# Patient Record
Sex: FEMALE | Race: WHITE | NOT HISPANIC OR LATINO | Employment: UNEMPLOYED | URBAN - METROPOLITAN AREA
[De-identification: names, ages, dates, MRNs, and addresses within clinical notes are randomized per-mention and may not be internally consistent; named-entity substitution may affect disease eponyms.]

---

## 2017-12-19 ENCOUNTER — HISTORICAL (OUTPATIENT)
Dept: RADIOLOGY | Facility: HOSPITAL | Age: 22
End: 2017-12-19

## 2018-01-08 ENCOUNTER — HISTORICAL (OUTPATIENT)
Dept: SURGERY | Facility: HOSPITAL | Age: 23
End: 2018-01-08

## 2018-01-11 ENCOUNTER — HISTORICAL (OUTPATIENT)
Dept: ANESTHESIOLOGY | Facility: HOSPITAL | Age: 23
End: 2018-01-11

## 2019-10-21 ENCOUNTER — HISTORICAL (OUTPATIENT)
Dept: LAB | Facility: HOSPITAL | Age: 24
End: 2019-10-21

## 2019-10-25 ENCOUNTER — HISTORICAL (OUTPATIENT)
Dept: RADIOLOGY | Facility: HOSPITAL | Age: 24
End: 2019-10-25

## 2019-10-31 ENCOUNTER — HISTORICAL (OUTPATIENT)
Dept: LAB | Facility: HOSPITAL | Age: 24
End: 2019-10-31

## 2020-12-17 LAB
PAP RECOMMENDATION EXT: NORMAL
PAP SMEAR: NORMAL

## 2021-06-28 ENCOUNTER — HISTORICAL (OUTPATIENT)
Dept: ADMINISTRATIVE | Facility: HOSPITAL | Age: 26
End: 2021-06-28

## 2021-06-28 LAB
APPEARANCE, UA: ABNORMAL
BACTERIA SPEC CULT: ABNORMAL
BILIRUB UR QL STRIP: NEGATIVE
COLOR UR: YELLOW
GLUCOSE (UA): NEGATIVE
HGB UR QL STRIP: NEGATIVE
KETONES UR QL STRIP: NEGATIVE
LEUKOCYTE ESTERASE UR QL STRIP: ABNORMAL
MUCOUS THREADS URNS QL MICRO: PRESENT
NITRITE UR QL STRIP: NEGATIVE
PH UR STRIP: 6.5 [PH] (ref 4.6–8)
PROT UR QL STRIP: ABNORMAL
RBC #/AREA URNS HPF: ABNORMAL /HPF
SP GR UR STRIP: 1.02 (ref 1–1.03)
SQUAMOUS EPITHELIAL, UA: ABNORMAL
UROBILINOGEN UR STRIP-ACNC: 1
WBC #/AREA URNS HPF: ABNORMAL /HPF

## 2021-09-07 ENCOUNTER — HISTORICAL (OUTPATIENT)
Dept: LAB | Facility: HOSPITAL | Age: 26
End: 2021-09-07

## 2022-01-28 ENCOUNTER — HISTORICAL (OUTPATIENT)
Dept: LAB | Facility: HOSPITAL | Age: 27
End: 2022-01-28

## 2022-01-28 LAB
APPEARANCE, UA: CLEAR
BACTERIA SPEC CULT: NORMAL
BILIRUB UR QL STRIP: NEGATIVE
COLOR UR: YELLOW
GLUCOSE (UA): NEGATIVE
HGB UR QL STRIP: NEGATIVE
KETONES UR QL STRIP: NEGATIVE
LEUKOCYTE ESTERASE UR QL STRIP: NEGATIVE
NITRITE UR QL STRIP: NEGATIVE
PH UR STRIP: 6.5 [PH] (ref 4.6–8)
PROT UR QL STRIP: NEGATIVE
RBC #/AREA URNS HPF: NORMAL /[HPF]
SARS-COV-2 AG RESP QL IA.RAPID: NEGATIVE
SP GR UR STRIP: 1.02 (ref 1–1.03)
SQUAMOUS EPITHELIAL, UA: NORMAL
UROBILINOGEN UR STRIP-ACNC: 0.2
WBC #/AREA URNS HPF: NORMAL /[HPF]

## 2022-03-24 ENCOUNTER — HISTORICAL (OUTPATIENT)
Dept: RADIOLOGY | Facility: HOSPITAL | Age: 27
End: 2022-03-24

## 2022-03-24 ENCOUNTER — HISTORICAL (OUTPATIENT)
Dept: ADMINISTRATIVE | Facility: HOSPITAL | Age: 27
End: 2022-03-24

## 2022-04-07 ENCOUNTER — HISTORICAL (OUTPATIENT)
Dept: ADMINISTRATIVE | Facility: HOSPITAL | Age: 27
End: 2022-04-07
Payer: MEDICAID

## 2022-04-24 VITALS
DIASTOLIC BLOOD PRESSURE: 69 MMHG | SYSTOLIC BLOOD PRESSURE: 110 MMHG | HEIGHT: 60 IN | BODY MASS INDEX: 27.48 KG/M2 | OXYGEN SATURATION: 99 % | WEIGHT: 140 LBS

## 2022-04-30 NOTE — OP NOTE
ADMITTING DIAGNOSIS:  Supraumbilical ventral hernia, not reducible, not obstructing, initial onset.    PROCEDURE:  Laparoscopic supraumbilical ventral hernia repair with a 9 cm circular Symbotex mesh.    POSTOPERATIVE DIAGNOSES:  Repair of initial onset ventral incisional hernia with a 9 cm Symbotex mesh using secure strap and ProTack and then reperitonealization of the nonadhesive side with the adhesive side toward the fascia.    PROCEDURE IN DETAIL:  The patient is a 22-year-old  female with anxiety, depressive disorder, bipolar disorder.  Smokes a half pack a day for about 5 years and had a  by Dr. Marroquin and Wilfrid as well as a laparoscopic cholecystectomy in .  She has a supraumbilical ventral hernia that was initially reducible and now nonreducible, not obstructed and required repair the patient was scheduled for repair after ultrasound on 2017 showed evidence that it was normal.  She was scheduled today for repair of a hernia defect.  The patient was brought to the operating room, supine position.  General anesthetic.  Prepped and draped in a sterile fashion.  Had a Visiport placed in the left upper quadrant with insertion of a 5 mm trocar in the left mid quadrant and another 5 mm trocar in the right upper quadrant.  The patient underwent reduction of the preperitoneal fat.  Two small 1-2 cm holes were identified [id number] in the supraumbilical region with preperitoneal fat incarcerated within them.  The preperitoneal fat was reduced and the patient underwent coverage.  A piece of mesh was placed right in between the 2 holes for good coverage circumferentially.  A 9 cm mesh was used.  The patient had the mesh tacked with a secure strap and ProTack, the adhesive side toward the fascia and the nonadhesive side toward the bowel.  The patient underwent reperitonealization of the peritoneum over the mesh and it was tacked as well to the mesh and fascia to prevent slippage of  the mesh.  Good coverage of the nonadhesive side of the mesh was also accomplished, approximately 50% of it was covered with the peritoneum that was remaining.  The patient did     well, had no problems or difficulties, was awakened and sent to recovery in good condition.  I appreciate the consultation referral from Dr. Porter and will notify him of my findings.        KURT/ESTUARDO   DD: 01/11/2018 1410   DT: 01/11/2018 1453  Job # [id number]    cc: Dr. Porter

## 2022-05-16 ENCOUNTER — OFFICE VISIT (OUTPATIENT)
Dept: FAMILY MEDICINE | Facility: CLINIC | Age: 27
End: 2022-05-16
Payer: MEDICAID

## 2022-05-16 VITALS
BODY MASS INDEX: 28.91 KG/M2 | HEART RATE: 76 BPM | SYSTOLIC BLOOD PRESSURE: 107 MMHG | RESPIRATION RATE: 18 BRPM | TEMPERATURE: 98 F | OXYGEN SATURATION: 99 % | WEIGHT: 147.25 LBS | HEIGHT: 60 IN | DIASTOLIC BLOOD PRESSURE: 74 MMHG

## 2022-05-16 DIAGNOSIS — K59.00 CONSTIPATION, UNSPECIFIED CONSTIPATION TYPE: ICD-10-CM

## 2022-05-16 DIAGNOSIS — K64.5 EXTERNAL THROMBOSED HEMORRHOIDS: Primary | ICD-10-CM

## 2022-05-16 DIAGNOSIS — N91.2 AMENORRHEA: ICD-10-CM

## 2022-05-16 DIAGNOSIS — N92.6 IRREGULAR MENSTRUAL CYCLE: ICD-10-CM

## 2022-05-16 LAB
B-HCG UR QL: NEGATIVE
CTP QC/QA: YES

## 2022-05-16 PROCEDURE — 99214 PR OFFICE/OUTPT VISIT, EST, LEVL IV, 30-39 MIN: ICD-10-PCS | Mod: ,,, | Performed by: FAMILY MEDICINE

## 2022-05-16 PROCEDURE — 3078F DIAST BP <80 MM HG: CPT | Mod: CPTII,,, | Performed by: FAMILY MEDICINE

## 2022-05-16 PROCEDURE — 81025 POCT URINE PREGNANCY: ICD-10-PCS | Mod: ,,, | Performed by: FAMILY MEDICINE

## 2022-05-16 PROCEDURE — 3074F PR MOST RECENT SYSTOLIC BLOOD PRESSURE < 130 MM HG: ICD-10-PCS | Mod: CPTII,,, | Performed by: FAMILY MEDICINE

## 2022-05-16 PROCEDURE — 1159F PR MEDICATION LIST DOCUMENTED IN MEDICAL RECORD: ICD-10-PCS | Mod: CPTII,,, | Performed by: FAMILY MEDICINE

## 2022-05-16 PROCEDURE — 1160F RVW MEDS BY RX/DR IN RCRD: CPT | Mod: CPTII,,, | Performed by: FAMILY MEDICINE

## 2022-05-16 PROCEDURE — 3008F BODY MASS INDEX DOCD: CPT | Mod: CPTII,,, | Performed by: FAMILY MEDICINE

## 2022-05-16 PROCEDURE — 99214 OFFICE O/P EST MOD 30 MIN: CPT | Mod: ,,, | Performed by: FAMILY MEDICINE

## 2022-05-16 PROCEDURE — 3074F SYST BP LT 130 MM HG: CPT | Mod: CPTII,,, | Performed by: FAMILY MEDICINE

## 2022-05-16 PROCEDURE — 1159F MED LIST DOCD IN RCRD: CPT | Mod: CPTII,,, | Performed by: FAMILY MEDICINE

## 2022-05-16 PROCEDURE — 3078F PR MOST RECENT DIASTOLIC BLOOD PRESSURE < 80 MM HG: ICD-10-PCS | Mod: CPTII,,, | Performed by: FAMILY MEDICINE

## 2022-05-16 PROCEDURE — 3008F PR BODY MASS INDEX (BMI) DOCUMENTED: ICD-10-PCS | Mod: CPTII,,, | Performed by: FAMILY MEDICINE

## 2022-05-16 PROCEDURE — 81025 URINE PREGNANCY TEST: CPT | Mod: ,,, | Performed by: FAMILY MEDICINE

## 2022-05-16 PROCEDURE — 1160F PR REVIEW ALL MEDS BY PRESCRIBER/CLIN PHARMACIST DOCUMENTED: ICD-10-PCS | Mod: CPTII,,, | Performed by: FAMILY MEDICINE

## 2022-05-16 RX ORDER — QUETIAPINE FUMARATE 50 MG/1
TABLET, FILM COATED ORAL
COMMUNITY
Start: 2022-01-25 | End: 2022-06-24

## 2022-05-16 RX ORDER — POLYETHYLENE GLYCOL 3350 17 G/17G
17 POWDER, FOR SOLUTION ORAL DAILY
Qty: 17 G | Refills: 3 | Status: SHIPPED | OUTPATIENT
Start: 2022-05-16 | End: 2022-06-15

## 2022-05-16 RX ORDER — ARIPIPRAZOLE 15 MG/1
TABLET ORAL
COMMUNITY
Start: 2022-01-25 | End: 2022-06-24

## 2022-05-16 RX ORDER — BUPROPION HYDROCHLORIDE 300 MG/1
TABLET ORAL
COMMUNITY
Start: 2022-01-25 | End: 2022-06-24

## 2022-05-16 RX ORDER — CYCLOBENZAPRINE HCL 10 MG
TABLET ORAL
COMMUNITY
Start: 2022-04-11 | End: 2022-06-24

## 2022-05-16 RX ORDER — BUSPIRONE HYDROCHLORIDE 15 MG/1
TABLET ORAL
COMMUNITY
Start: 2022-01-25 | End: 2022-06-24

## 2022-05-16 RX ORDER — TIZANIDINE 4 MG/1
TABLET ORAL
COMMUNITY
Start: 2022-04-29 | End: 2022-06-24

## 2022-05-16 NOTE — PROGRESS NOTES
Adrienne Caba  05/16/2022  [id number]    Marcia Garrido MD  Patient Care Team:  Marcia Mckeon MD as PCP - General (Family Medicine)      Chief Complaint:  Chief Complaint   Patient presents with    Hemorrhoids    Amenorrhea       History of Present Illness:  HPI    27 y.o. female who presents today c/o hemorrhoids and amenorrhea. States she is one week late for cycle. Of note, she has been late a few times and has presented to ED for this several times over the past 6 mts. She has not taken a UPT at home. UPT neg in office.      She c/o rectal pain when sitting and when having bm's and when wiping. Has not noticed any blood in stool or on tissue. She states she is having a BM 1-2x per day that she describes as pellets and she does have strain. It alternates with diarrhea at times. She has not tried any otc meds for hard stool or for hemorrhoids.     Review of Systems  General: denies f/c, weight loss, night sweats, decreased appetite  Eye: denies blurred vision, changes in vision  Respiratory: denies sob, wheezing, cough  Cardiovascular: denies chest pain, palpitations, edema  Gastrointestinal: denies abdominal pain, n/v, constipation, diarrhea  Integumentary: denies rashes, pruritis        Health Maintenance  Health Maintenance Topics with due status: Not Due       Topic Last Completion Date    TETANUS VACCINE 10/31/2019    Influenza Vaccine 10/31/2019     Health Maintenance Due   Topic Date Due    Hepatitis C Screening  Never done    Lipid Panel  Never done    COVID-19 Vaccine (1) Never done    Pneumococcal Vaccines (Age 0-64) (2 - PPSV23 or PCV20) 01/22/2003    HIV Screening  Never done    Pap Smear  Never done       Exam:  Vitals:    05/16/22 0930   BP: 107/74   Pulse: 76   Resp: 18   Temp: 98.1 °F (36.7 °C)     Weight: 66.8 kg (147 lb 4.3 oz)   Body mass index is 28.54 kg/m².      Physical Exam  Constitutional: NAD, alert, pleasant  Respiratory: CTAB, no wheezes, rales or rhonchi. No  accessory muscle use  Eyes: EOMI  Cardiovascular: RRR, No m/r/g. No JVD. No LE edema  Gastrointestinal: BS+, nontender, nondistended  Integumentary: warm, dry, intact  Psych: AA&Ox3  Rectal exam: (rachel present) thrombosed hemorrhoid at 9 o' clock position        Assessment:  Problem List Items Addressed This Visit    None     Visit Diagnoses     External thrombosed hemorrhoids    -  Primary    Relevant Orders    Ambulatory referral/consult to Gastroenterology    Amenorrhea        Relevant Orders    POCT Urine Pregnancy    Irregular menstrual cycle        Constipation, unspecified constipation type        Relevant Orders    Ambulatory referral/consult to Gastroenterology          Plan:  External thrombosed hemorrhoids  -     Ambulatory referral/consult to Gastroenterology; Future; Expected date: 05/23/2022    Amenorrhea  -     POCT Urine Pregnancy    Irregular menstrual cycle    Constipation, unspecified constipation type  -     Ambulatory referral/consult to Gastroenterology; Future; Expected date: 05/23/2022    Other orders  -     polyethylene glycol (GLYCOLAX) 17 gram/dose powder; Take 17 g by mouth once daily.  Dispense: 17 g; Refill: 3    - start miralax daily. Drink  oz of water per day. Refer to gi. Increase fiber intake  - UPT neg. Call me if no cycle in one week.    -Patient's lab results were reviewed and discussed with patient  -Treatment options and alternatives were discussed with the patient. Patient expressed understanding. Patient was given the opportunity to ask questions and be an active participant in their medical care. Patient had no further questions or concerns at this time.       Follow up: No follow-ups on file.      Care Plan/Goals: Reviewed   Goals    None

## 2022-06-11 ENCOUNTER — HOSPITAL ENCOUNTER (EMERGENCY)
Facility: HOSPITAL | Age: 27
Discharge: HOME | End: 2022-06-12
Attending: STUDENT IN AN ORGANIZED HEALTH CARE EDUCATION/TRAINING PROGRAM
Payer: MEDICAID

## 2022-06-11 DIAGNOSIS — L03.112 CELLULITIS OF LEFT AXILLA: Primary | ICD-10-CM

## 2022-06-11 DIAGNOSIS — L03.113 CELLULITIS OF RIGHT UPPER EXTREMITY: ICD-10-CM

## 2022-06-11 PROCEDURE — 99283 EMERGENCY DEPT VISIT LOW MDM: CPT

## 2022-06-11 RX ORDER — LIDOCAINE HYDROCHLORIDE 10 MG/ML
10 INJECTION, SOLUTION EPIDURAL; INFILTRATION; INTRACAUDAL; PERINEURAL
Status: DISCONTINUED | OUTPATIENT
Start: 2022-06-11 | End: 2022-06-12 | Stop reason: HOSPADM

## 2022-06-12 VITALS
WEIGHT: 145 LBS | OXYGEN SATURATION: 98 % | BODY MASS INDEX: 29.23 KG/M2 | RESPIRATION RATE: 18 BRPM | TEMPERATURE: 98 F | HEIGHT: 59 IN | DIASTOLIC BLOOD PRESSURE: 86 MMHG | SYSTOLIC BLOOD PRESSURE: 116 MMHG | HEART RATE: 73 BPM

## 2022-06-12 PROCEDURE — 25000003 PHARM REV CODE 250: Performed by: STUDENT IN AN ORGANIZED HEALTH CARE EDUCATION/TRAINING PROGRAM

## 2022-06-12 RX ORDER — SULFAMETHOXAZOLE AND TRIMETHOPRIM 800; 160 MG/1; MG/1
1 TABLET ORAL 2 TIMES DAILY
Qty: 14 TABLET | Refills: 0 | Status: SHIPPED | OUTPATIENT
Start: 2022-06-12 | End: 2022-06-19

## 2022-06-12 RX ORDER — HYDROCODONE BITARTRATE AND ACETAMINOPHEN 5; 325 MG/1; MG/1
1 TABLET ORAL EVERY 4 HOURS PRN
Qty: 4 TABLET | Refills: 0 | Status: SHIPPED | OUTPATIENT
Start: 2022-06-12 | End: 2022-06-14

## 2022-06-12 RX ORDER — HYDROCODONE BITARTRATE AND ACETAMINOPHEN 5; 325 MG/1; MG/1
1 TABLET ORAL
Status: COMPLETED | OUTPATIENT
Start: 2022-06-12 | End: 2022-06-12

## 2022-06-12 RX ADMIN — HYDROCODONE BITARTRATE AND ACETAMINOPHEN 1 TABLET: 5; 325 TABLET ORAL at 12:06

## 2022-06-12 NOTE — ED PROVIDER NOTES
Encounter Date: 2022       History     Chief Complaint   Patient presents with    Abscess     Onset several days ago . Notes areas of reddness and swelling to right upper ext and left axilla     27-year-old female presents to the emergency department for evaluation of skin infections. Approximately one week ago she noticed boils in her left axilla and two days ago she noticed another boil of her lateral right upper arm. Her pain has been worsening and she reports it is now 8.5/10. She tells me that she does not have these frequently, that she had an absence once before as a child. She did not try any pain medications at home but she did try using an over-the-counter ointment marketed to help with boils. She denies fever, chills, nausea, vomiting, dysuria. She reports an allergy to penicillins, hives.        Review of patient's allergies indicates:   Allergen Reactions    Ampicillin Hives    Penicillin Rash and Nausea And Vomiting     Past Medical History:   Diagnosis Date    Amenorrhea, unspecified     Bacterial vaginosis     Chronic abdominal pain     Excessive daytime sleepiness     Free fluid in pelvis     Pelvic pain     Unspecified hemorrhoids     Urinary frequency      Past Surgical History:   Procedure Laterality Date     SECTION  09/15/2015     SECTION  2013    CHOLECYSTECTOMY  2011    HERNIA REPAIR  2018     Family History   Problem Relation Age of Onset    Diabetes Mellitus Mother     Hyperlipidemia Mother     Hypertension Father      Social History     Tobacco Use    Smoking status: Current Every Day Smoker     Packs/day: 1.00     Types: Cigarettes    Smokeless tobacco: Never Used   Substance Use Topics    Alcohol use: Never    Drug use: Never     Review of Systems   Constitutional: Negative for chills and fever.   Gastrointestinal: Negative for nausea and vomiting.   Genitourinary: Negative for dysuria.   Skin: Positive for color change.         Painful boils   All other systems reviewed and are negative.      Physical Exam     Initial Vitals [06/11/22 2240]   BP Pulse Resp Temp SpO2   123/82 84 16 98.4 °F (36.9 °C) 98 %      MAP       --         Physical Exam    Constitutional: Vital signs are normal. She appears well-developed. She is not diaphoretic.  Non-toxic appearance. She does not have a sickly appearance. She does not appear ill. No distress.   HENT:   Head: Normocephalic and atraumatic.   Eyes: EOM are normal.   Cardiovascular: Normal rate and regular rhythm.   Pulses:       Radial pulses are 2+ on the right side and 2+ on the left side.   Pulmonary/Chest: No respiratory distress. She has no wheezes.   Abdominal: Abdomen is soft. There is no abdominal tenderness.     Neurological: She is alert and oriented to person, place, and time.   Skin: Skin is warm and dry. Capillary refill takes less than 2 seconds. Abscess: two very small pustules to left axilla, about 4cm area of erthema to lateral Rt upper arm. There is erythema.         ED Course   Procedures  Labs Reviewed - No data to display       Imaging Results    None          Medications   HYDROcodone-acetaminophen 5-325 mg per tablet 1 tablet (1 tablet Oral Given 6/12/22 0011)     Medical Decision Making:   Initial Assessment:   See HPI/ROS/PE  Differential Diagnosis:   Diff Dx includes but is not limited to cellulitis, abscesses, hidradenitis suppurativa  ED Management:  See ED Course             ED Course as of 06/12/22 0537   Sat Jun 11, 2022   1975 27-year-old female presents to the emergency department for evaluation of skin infections. Approximately one week ago she noticed boils in her left axilla and two days ago she noticed another boil of her lateral right upper arm. Her pain has been worsening and she reports it is now 8.5/10.  [CW]   8347 Bedside ultrasound reveals that she has localized cellulitis without drainable abscesses at both her left axilla and right upper arm. Given her  grossly normal appearance(excluding small skin changes), and normal vital signs, and lack of systemic symptoms, patient seems to have multiple sites of cellulitis with few pustules that are not currently drainable, low to no concern for sepsis at this time. Patient is agreeable with plan to discharge home with prescription for antibiotics and few pain pills with instructions to follow up with her primary care doctor. Given instructions regarding strict return precautions. [CW]      ED Course User Index  [CW] Reese Estrada MD             Clinical Impression:   Final diagnoses:  [L03.112] Cellulitis of left axilla (Primary)  [L03.113] Cellulitis of right upper extremity          ED Disposition Condition    Discharge Stable        ED Prescriptions     Medication Sig Dispense Start Date End Date Auth. Provider    sulfamethoxazole-trimethoprim 800-160mg (BACTRIM DS) 800-160 mg Tab Take 1 tablet by mouth 2 (two) times daily. for 7 days 14 tablet 6/12/2022 6/19/2022 Reese Estrada MD    HYDROcodone-acetaminophen (NORCO) 5-325 mg per tablet Take 1 tablet by mouth every 4 (four) hours as needed for Pain. 4 tablet 6/12/2022 6/14/2022 Reese Estrada MD        Follow-up Information     Follow up With Specialties Details Why Contact Info    Marcia Mckeon MD Family Medicine Call  to follow up of today's complaints [address] [facility] [address]  [phone]      [facility] - Emergency Dept Emergency Medicine Go to  If symptoms worsen [address] [facility] [id number] [phone]           Reese Estrada MD  06/12/22 5897

## 2022-06-12 NOTE — ED NOTES
Pt given discharge instructions pt instructed to follow up with pcp and return to er if complications occur. Pt stated understanding instructions.

## 2022-06-12 NOTE — DISCHARGE INSTRUCTIONS
You were seen in the emergency department today for a skin infection. Your symptoms are consistent with cellulitis. I have written you a prescription for an antibiotic to treat your infection. Follow-up with your primary care doctor as needed. Return to the emergency department for new or worsening symptoms such as worsening redness/swelling/pain, fevers, feeling as though you may pass out.

## 2022-06-14 ENCOUNTER — OFFICE VISIT (OUTPATIENT)
Dept: FAMILY MEDICINE | Facility: CLINIC | Age: 27
End: 2022-06-14
Payer: MEDICAID

## 2022-06-14 VITALS
WEIGHT: 147.25 LBS | RESPIRATION RATE: 16 BRPM | SYSTOLIC BLOOD PRESSURE: 101 MMHG | OXYGEN SATURATION: 98 % | HEART RATE: 89 BPM | DIASTOLIC BLOOD PRESSURE: 69 MMHG | BODY MASS INDEX: 29.68 KG/M2 | HEIGHT: 59 IN | TEMPERATURE: 98 F

## 2022-06-14 DIAGNOSIS — L02.413 ABSCESS OF RIGHT ARM: Primary | ICD-10-CM

## 2022-06-14 PROCEDURE — 1159F MED LIST DOCD IN RCRD: CPT | Mod: CPTII,,, | Performed by: FAMILY MEDICINE

## 2022-06-14 PROCEDURE — 3074F PR MOST RECENT SYSTOLIC BLOOD PRESSURE < 130 MM HG: ICD-10-PCS | Mod: CPTII,,, | Performed by: FAMILY MEDICINE

## 2022-06-14 PROCEDURE — 3008F BODY MASS INDEX DOCD: CPT | Mod: CPTII,,, | Performed by: FAMILY MEDICINE

## 2022-06-14 PROCEDURE — 1160F RVW MEDS BY RX/DR IN RCRD: CPT | Mod: CPTII,,, | Performed by: FAMILY MEDICINE

## 2022-06-14 PROCEDURE — 1159F PR MEDICATION LIST DOCUMENTED IN MEDICAL RECORD: ICD-10-PCS | Mod: CPTII,,, | Performed by: FAMILY MEDICINE

## 2022-06-14 PROCEDURE — 10060 I&D ABSCESS SIMPLE/SINGLE: CPT | Mod: ,,, | Performed by: FAMILY MEDICINE

## 2022-06-14 PROCEDURE — 87070 CULTURE OTHR SPECIMN AEROBIC: CPT | Performed by: FAMILY MEDICINE

## 2022-06-14 PROCEDURE — 1160F PR REVIEW ALL MEDS BY PRESCRIBER/CLIN PHARMACIST DOCUMENTED: ICD-10-PCS | Mod: CPTII,,, | Performed by: FAMILY MEDICINE

## 2022-06-14 PROCEDURE — 3078F PR MOST RECENT DIASTOLIC BLOOD PRESSURE < 80 MM HG: ICD-10-PCS | Mod: CPTII,,, | Performed by: FAMILY MEDICINE

## 2022-06-14 PROCEDURE — 10060 INCISION & DRAINAGE: ICD-10-PCS | Mod: ,,, | Performed by: FAMILY MEDICINE

## 2022-06-14 PROCEDURE — 3074F SYST BP LT 130 MM HG: CPT | Mod: CPTII,,, | Performed by: FAMILY MEDICINE

## 2022-06-14 PROCEDURE — 99214 OFFICE O/P EST MOD 30 MIN: CPT | Mod: 25,,, | Performed by: FAMILY MEDICINE

## 2022-06-14 PROCEDURE — 99214 PR OFFICE/OUTPT VISIT, EST, LEVL IV, 30-39 MIN: ICD-10-PCS | Mod: 25,,, | Performed by: FAMILY MEDICINE

## 2022-06-14 PROCEDURE — 3008F PR BODY MASS INDEX (BMI) DOCUMENTED: ICD-10-PCS | Mod: CPTII,,, | Performed by: FAMILY MEDICINE

## 2022-06-14 PROCEDURE — 3078F DIAST BP <80 MM HG: CPT | Mod: CPTII,,, | Performed by: FAMILY MEDICINE

## 2022-06-14 RX ORDER — HYDROCODONE BITARTRATE AND ACETAMINOPHEN 5; 325 MG/1; MG/1
1 TABLET ORAL EVERY 8 HOURS PRN
Qty: 9 TABLET | Refills: 0 | Status: SHIPPED | OUTPATIENT
Start: 2022-06-14 | End: 2022-06-17

## 2022-06-14 NOTE — PROCEDURES
"Incision & Drainage    Date/Time: 6/14/2022 12:00 PM  Performed by: Marcia Mckeon MD  Authorized by: Marcia Mckeon MD     Time out: Immediately prior to procedure a "time out" was called to verify the correct patient, procedure, equipment, support staff and site/side marked as required.    Consent Done?:  Yes (Written)    Type:  Abscess  Body area:  Upper extremity  Location details:  Right arm  Anesthesia:  Local infiltration  Local anesthetic: xylocaine 1% with epi.  Anesthetic total (ml):  2.5  Risk factor:  Underlying major vessel, underlying major nerve and coagulopathy  Scalpel size:  11  Incision type:  Single straight  Complexity:  Simple  Drainage:  Pus and serosanguinous  Drainage amount:  Copious  Wound treatment:  Wound packed  Packing material:  1/4 in iodoform gauze  Patient tolerance:  Patient tolerated the procedure well with no immediate complications      "

## 2022-06-14 NOTE — PROGRESS NOTES
Adrienne Caba  06/14/2022  [id number]    Marcia Garrido MD  Patient Care Team:  Marcia Mckeon MD as PCP - General (Family Medicine)      Chief Complaint:  Chief Complaint   Patient presents with    Follow-up     ER follow up 6/11/22 - boil on arm       History of Present Illness:  HPI    27 y.o. female who presents today for ER follow up for boils to left axilla. She went to ER 3 days ago and bedside US was neg for abscess. She was discharged on bactrim DS. She did not start bactrim until last night and now has an abscess to right upper arm x 2 weeks, getting worse despite antibiotic treatment. Denies drainage, f/c.     Review of Systems  General: denies f/c, weight loss, night sweats, decreased appetite  Eye: denies blurred vision, changes in vision  Respiratory: denies sob, wheezing, cough  Cardiovascular: denies chest pain, palpitations, edema  Gastrointestinal: denies abdominal pain, n/v, constipation, diarrhea          Health Maintenance  Health Maintenance Topics with due status: Not Due       Topic Last Completion Date    TETANUS VACCINE 10/31/2019    Influenza Vaccine 10/31/2019    Pap Smear 01/26/2021     Health Maintenance Due   Topic Date Due    Hepatitis C Screening  Never done    Lipid Panel  Never done    COVID-19 Vaccine (1) Never done    Pneumococcal Vaccines (Age 0-64) (2 - PPSV23 or PCV20) 01/22/2003    HIV Screening  Never done       Exam:  Vitals:    06/14/22 1207   BP: 101/69   Pulse: 89   Resp: 16   Temp: 97.9 °F (36.6 °C)     Weight: 66.8 kg (147 lb 4.3 oz)   Body mass index is 30.09 kg/m².      Physical Exam  Constitutional: NAD, alert, pleasant  Respiratory: CTAB, no wheezes, rales or rhonchi. No accessory muscle use  Eyes: EOMI  Cardiovascular: RRR, No m/r/g. No JVD. No LE edema  Gastrointestinal: BS+, nontender, nondistended  Integumentary: warm, dry, intact. There is a fluctuant abscess to right upper arm with surrounding cellulitis and induration. It is very  tender to touch.   Psych: AA&Ox3        1. Abscess of right arm  -     Wound Culture  -     Incision & Drainage     I&D performed today. See separate procedure note  Continue bactrim ds  RTC on Friday to repack wound  Will give norco 5 for breakthrough pain. This medication may cause addiction, dependence, hallucinations, decreased respiratory rate, constipation, falls, confusion and death.   Go to ER for f/c, night sweats, lethargy    Follow up: No follow-ups on file.      Care Plan/Goals: Reviewed   Goals    None

## 2022-06-16 ENCOUNTER — TELEPHONE (OUTPATIENT)
Dept: FAMILY MEDICINE | Facility: CLINIC | Age: 27
End: 2022-06-16
Payer: MEDICAID

## 2022-06-16 LAB — BACTERIA SPEC CULT: ABNORMAL

## 2022-06-16 NOTE — PROGRESS NOTES
Patient's wound culture grew out MRSA. However, it Is sensitive to Bactrim DS. She needs to complete course of antibiotics even if wound is healing.

## 2022-06-17 ENCOUNTER — OFFICE VISIT (OUTPATIENT)
Dept: FAMILY MEDICINE | Facility: CLINIC | Age: 27
End: 2022-06-17
Payer: MEDICAID

## 2022-06-17 VITALS
DIASTOLIC BLOOD PRESSURE: 69 MMHG | HEIGHT: 59 IN | HEART RATE: 85 BPM | OXYGEN SATURATION: 99 % | SYSTOLIC BLOOD PRESSURE: 110 MMHG | BODY MASS INDEX: 29.38 KG/M2 | WEIGHT: 145.75 LBS | TEMPERATURE: 98 F | RESPIRATION RATE: 16 BRPM

## 2022-06-17 DIAGNOSIS — L02.413 ABSCESS OF RIGHT ARM: Primary | ICD-10-CM

## 2022-06-17 PROCEDURE — 3078F DIAST BP <80 MM HG: CPT | Mod: CPTII,,, | Performed by: FAMILY MEDICINE

## 2022-06-17 PROCEDURE — 3074F PR MOST RECENT SYSTOLIC BLOOD PRESSURE < 130 MM HG: ICD-10-PCS | Mod: CPTII,,, | Performed by: FAMILY MEDICINE

## 2022-06-17 PROCEDURE — 1160F PR REVIEW ALL MEDS BY PRESCRIBER/CLIN PHARMACIST DOCUMENTED: ICD-10-PCS | Mod: CPTII,,, | Performed by: FAMILY MEDICINE

## 2022-06-17 PROCEDURE — 3008F PR BODY MASS INDEX (BMI) DOCUMENTED: ICD-10-PCS | Mod: CPTII,,, | Performed by: FAMILY MEDICINE

## 2022-06-17 PROCEDURE — 1160F RVW MEDS BY RX/DR IN RCRD: CPT | Mod: CPTII,,, | Performed by: FAMILY MEDICINE

## 2022-06-17 PROCEDURE — 99024 PR POST-OP FOLLOW-UP VISIT: ICD-10-PCS | Mod: ,,, | Performed by: FAMILY MEDICINE

## 2022-06-17 PROCEDURE — 3078F PR MOST RECENT DIASTOLIC BLOOD PRESSURE < 80 MM HG: ICD-10-PCS | Mod: CPTII,,, | Performed by: FAMILY MEDICINE

## 2022-06-17 PROCEDURE — 3074F SYST BP LT 130 MM HG: CPT | Mod: CPTII,,, | Performed by: FAMILY MEDICINE

## 2022-06-17 PROCEDURE — 1159F PR MEDICATION LIST DOCUMENTED IN MEDICAL RECORD: ICD-10-PCS | Mod: CPTII,,, | Performed by: FAMILY MEDICINE

## 2022-06-17 PROCEDURE — 1159F MED LIST DOCD IN RCRD: CPT | Mod: CPTII,,, | Performed by: FAMILY MEDICINE

## 2022-06-17 PROCEDURE — 99024 POSTOP FOLLOW-UP VISIT: CPT | Mod: ,,, | Performed by: FAMILY MEDICINE

## 2022-06-17 PROCEDURE — 3008F BODY MASS INDEX DOCD: CPT | Mod: CPTII,,, | Performed by: FAMILY MEDICINE

## 2022-06-17 NOTE — PROGRESS NOTES
"Adrienne Caba  06/17/2022  [id number]    Marcia Garrido MD  Patient Care Team:  Marcia Mckeon MD as PCP - General (Family Medicine)      Chief Complaint:  Chief Complaint   Patient presents with    Wound Check       History of Present Illness:  HPI    27 y.o. female who presents today for wound recheck. I I&D'ed abscess to right upper arm two days ago and packed it. She is on bactrim ds. Wound culture grew out MRSA sensitive to bactrim. She is compliant with antibiotics and states pain is better. Denies f/c, night sweats.     Review of Systems    General: denies f/c, weight loss, night sweats, decreased appetite  Eye: denies blurred vision, changes in vision  Respiratory: denies sob, wheezing, cough  Cardiovascular: denies chest pain, palpitations, edema  Gastrointestinal: denies abdominal pain, n/v, constipation, diarrhea  Integumentary: denies rashes, pruritis      Health Maintenance  Health Maintenance Topics with due status: Not Due       Topic Last Completion Date    TETANUS VACCINE 10/31/2019    Influenza Vaccine 10/31/2019    Pap Smear 01/26/2021     Health Maintenance Due   Topic Date Due    Hepatitis C Screening  Never done    Lipid Panel  Never done    COVID-19 Vaccine (1) Never done    Pneumococcal Vaccines (Age 0-64) (2 - PPSV23 or PCV20) 01/22/2003    HIV Screening  Never done       Exam:  Vitals:    06/17/22 0841   BP: 110/69   Pulse: 85   Resp: 16   Temp: 98.1 °F (36.7 °C)     Weight: 66.1 kg (145 lb 11.6 oz)   Body mass index is 29.77 kg/m².      Physical Exam  Constitutional: NAD, alert, pleasant  Respiratory: CTAB, no wheezes, rales or rhonchi. No accessory muscle use  Eyes: EOMI  Cardiovascular: RRR, No m/r/g. No JVD. No LE edema  Gastrointestinal: BS+, nontender, nondistended  Integumentary: warm, dry, intact. Right arm abscess is much smaller, minimal erythema. Packing in place with yellow drainage. NO odor. Wound was repacked with 1/4" iodoform gauze.   Psych: " AA&Ox3        1. Abscess of right arm     -wound repacked. Return on Monday to repack  - keep clean and dry. No swimming  - continue antibiotics as directed    Follow up: Follow up in about 3 days (around 6/20/2022) for wound check.      Care Plan/Goals: Reviewed   Goals    None

## 2022-06-20 ENCOUNTER — OFFICE VISIT (OUTPATIENT)
Dept: FAMILY MEDICINE | Facility: CLINIC | Age: 27
End: 2022-06-20
Payer: MEDICAID

## 2022-06-20 VITALS
RESPIRATION RATE: 18 BRPM | TEMPERATURE: 98 F | BODY MASS INDEX: 29.82 KG/M2 | OXYGEN SATURATION: 99 % | SYSTOLIC BLOOD PRESSURE: 117 MMHG | HEIGHT: 59 IN | DIASTOLIC BLOOD PRESSURE: 73 MMHG | WEIGHT: 147.94 LBS | HEART RATE: 76 BPM

## 2022-06-20 DIAGNOSIS — Z48.89 ENCOUNTER FOR POST SURGICAL WOUND CHECK: Primary | ICD-10-CM

## 2022-06-20 PROCEDURE — 3074F SYST BP LT 130 MM HG: CPT | Mod: CPTII,,, | Performed by: FAMILY MEDICINE

## 2022-06-20 PROCEDURE — 3074F PR MOST RECENT SYSTOLIC BLOOD PRESSURE < 130 MM HG: ICD-10-PCS | Mod: CPTII,,, | Performed by: FAMILY MEDICINE

## 2022-06-20 PROCEDURE — 3008F BODY MASS INDEX DOCD: CPT | Mod: CPTII,,, | Performed by: FAMILY MEDICINE

## 2022-06-20 PROCEDURE — 1160F RVW MEDS BY RX/DR IN RCRD: CPT | Mod: CPTII,,, | Performed by: FAMILY MEDICINE

## 2022-06-20 PROCEDURE — 3008F PR BODY MASS INDEX (BMI) DOCUMENTED: ICD-10-PCS | Mod: CPTII,,, | Performed by: FAMILY MEDICINE

## 2022-06-20 PROCEDURE — 3078F PR MOST RECENT DIASTOLIC BLOOD PRESSURE < 80 MM HG: ICD-10-PCS | Mod: CPTII,,, | Performed by: FAMILY MEDICINE

## 2022-06-20 PROCEDURE — 99024 POSTOP FOLLOW-UP VISIT: CPT | Mod: ,,, | Performed by: FAMILY MEDICINE

## 2022-06-20 PROCEDURE — 99024 PR POST-OP FOLLOW-UP VISIT: ICD-10-PCS | Mod: ,,, | Performed by: FAMILY MEDICINE

## 2022-06-20 PROCEDURE — 1160F PR REVIEW ALL MEDS BY PRESCRIBER/CLIN PHARMACIST DOCUMENTED: ICD-10-PCS | Mod: CPTII,,, | Performed by: FAMILY MEDICINE

## 2022-06-20 PROCEDURE — 3078F DIAST BP <80 MM HG: CPT | Mod: CPTII,,, | Performed by: FAMILY MEDICINE

## 2022-06-20 PROCEDURE — 1159F PR MEDICATION LIST DOCUMENTED IN MEDICAL RECORD: ICD-10-PCS | Mod: CPTII,,, | Performed by: FAMILY MEDICINE

## 2022-06-20 PROCEDURE — 1159F MED LIST DOCD IN RCRD: CPT | Mod: CPTII,,, | Performed by: FAMILY MEDICINE

## 2022-06-20 NOTE — PROGRESS NOTES
"Adrienne Caba  06/20/2022  [id number]    Marcia Garrido MD  Patient Care Team:  Marcia Mckeon MD as PCP - General (Family Medicine)      Chief Complaint:  Chief Complaint   Patient presents with    Wound Check       History of Present Illness:  HPI    27 y.o. female who presents today for wound recheck. It was repacked 3 days ago and still has little drainage.  She is on bactrim ds. Wound culture grew out MRSA sensitive to bactrim. She is compliant with antibiotics and states pain is better. Denies f/c, night sweats.     Review of Systems    General: denies f/c, weight loss, night sweats, decreased appetite  Eye: denies blurred vision, changes in vision  Respiratory: denies sob, wheezing, cough  Cardiovascular: denies chest pain, palpitations, edema  Gastrointestinal: denies abdominal pain, n/v, constipation, diarrhea  Integumentary: denies rashes, pruritis      Health Maintenance  Health Maintenance Topics with due status: Not Due       Topic Last Completion Date    TETANUS VACCINE 10/31/2019    Influenza Vaccine 10/31/2019    Pap Smear 01/26/2021     Health Maintenance Due   Topic Date Due    Hepatitis C Screening  Never done    Lipid Panel  Never done    COVID-19 Vaccine (1) Never done    Pneumococcal Vaccines (Age 0-64) (2 - PPSV23 or PCV20) 01/22/2003    HIV Screening  Never done       Exam:  Vitals:    06/20/22 0827   BP: 117/73   Pulse: 76   Resp: 18   Temp: 98.1 °F (36.7 °C)     Weight: 67.1 kg (147 lb 14.9 oz)   Body mass index is 30.22 kg/m².      Physical Exam  Constitutional: NAD, alert, pleasant  Respiratory: CTAB, no wheezes, rales or rhonchi. No accessory muscle use  Eyes: EOMI  Cardiovascular: RRR, No m/r/g. No JVD. No LE edema  Gastrointestinal: BS+, nontender, nondistended  Integumentary: warm, dry, intact. Right erythema has resolved.  Packing in place with yellow drainage. NO odor. Wound was repacked with 1/4" iodoform gauze.   Psych: AA&Ox3        1. Encounter for post " surgical wound check     -wound repacked. Return 2 days to repack  - keep clean and dry. No swimming      Follow up: No follow-ups on file.      Care Plan/Goals: Reviewed   Goals    None

## 2022-06-22 ENCOUNTER — OFFICE VISIT (OUTPATIENT)
Dept: FAMILY MEDICINE | Facility: CLINIC | Age: 27
End: 2022-06-22
Payer: MEDICAID

## 2022-06-22 VITALS
HEIGHT: 59 IN | DIASTOLIC BLOOD PRESSURE: 67 MMHG | BODY MASS INDEX: 29.47 KG/M2 | HEART RATE: 76 BPM | WEIGHT: 146.19 LBS | OXYGEN SATURATION: 98 % | RESPIRATION RATE: 16 BRPM | TEMPERATURE: 98 F | SYSTOLIC BLOOD PRESSURE: 101 MMHG

## 2022-06-22 DIAGNOSIS — L02.413 ABSCESS OF RIGHT ARM: Primary | ICD-10-CM

## 2022-06-22 PROCEDURE — 3078F DIAST BP <80 MM HG: CPT | Mod: CPTII,,, | Performed by: FAMILY MEDICINE

## 2022-06-22 PROCEDURE — 3008F BODY MASS INDEX DOCD: CPT | Mod: CPTII,,, | Performed by: FAMILY MEDICINE

## 2022-06-22 PROCEDURE — 1159F MED LIST DOCD IN RCRD: CPT | Mod: CPTII,,, | Performed by: FAMILY MEDICINE

## 2022-06-22 PROCEDURE — 99024 PR POST-OP FOLLOW-UP VISIT: ICD-10-PCS | Mod: ,,, | Performed by: FAMILY MEDICINE

## 2022-06-22 PROCEDURE — 3074F SYST BP LT 130 MM HG: CPT | Mod: CPTII,,, | Performed by: FAMILY MEDICINE

## 2022-06-22 PROCEDURE — 3074F PR MOST RECENT SYSTOLIC BLOOD PRESSURE < 130 MM HG: ICD-10-PCS | Mod: CPTII,,, | Performed by: FAMILY MEDICINE

## 2022-06-22 PROCEDURE — 3078F PR MOST RECENT DIASTOLIC BLOOD PRESSURE < 80 MM HG: ICD-10-PCS | Mod: CPTII,,, | Performed by: FAMILY MEDICINE

## 2022-06-22 PROCEDURE — 1159F PR MEDICATION LIST DOCUMENTED IN MEDICAL RECORD: ICD-10-PCS | Mod: CPTII,,, | Performed by: FAMILY MEDICINE

## 2022-06-22 PROCEDURE — 1160F PR REVIEW ALL MEDS BY PRESCRIBER/CLIN PHARMACIST DOCUMENTED: ICD-10-PCS | Mod: CPTII,,, | Performed by: FAMILY MEDICINE

## 2022-06-22 PROCEDURE — 1160F RVW MEDS BY RX/DR IN RCRD: CPT | Mod: CPTII,,, | Performed by: FAMILY MEDICINE

## 2022-06-22 PROCEDURE — 99024 POSTOP FOLLOW-UP VISIT: CPT | Mod: ,,, | Performed by: FAMILY MEDICINE

## 2022-06-22 PROCEDURE — 3008F PR BODY MASS INDEX (BMI) DOCUMENTED: ICD-10-PCS | Mod: CPTII,,, | Performed by: FAMILY MEDICINE

## 2022-06-22 NOTE — PROGRESS NOTES
"Adrienne Caba  06/22/2022  [id number]    Marcia Garrido MD  Patient Care Team:  Marcia Mckeon MD as PCP - General (Family Medicine)      Chief Complaint:  Chief Complaint   Patient presents with    Wound Check       History of Present Illness:  Wound Check        27 y.o. female who presents today for wound recheck. It was repacked 2 days ago and is improving significantly.  She completed a course bactrim ds. Wound culture grew out MRSA sensitive to bactrim. Denies f/c, night sweats.     Review of Systems    General: denies f/c, weight loss, night sweats, decreased appetite  Eye: denies blurred vision, changes in vision  Respiratory: denies sob, wheezing, cough  Cardiovascular: denies chest pain, palpitations, edema  Gastrointestinal: denies abdominal pain, n/v, constipation, diarrhea  Integumentary: denies rashes, pruritis      Health Maintenance  Health Maintenance Topics with due status: Not Due       Topic Last Completion Date    TETANUS VACCINE 10/31/2019    Influenza Vaccine 10/31/2019    Pap Smear 01/26/2021     Health Maintenance Due   Topic Date Due    Hepatitis C Screening  Never done    Lipid Panel  Never done    COVID-19 Vaccine (1) Never done    Pneumococcal Vaccines (Age 0-64) (2 - PPSV23 or PCV20) 01/22/2003    HIV Screening  Never done       Exam:  Vitals:    06/22/22 1016   BP: 101/67   Pulse: 76   Resp: 16   Temp: 97.7 °F (36.5 °C)     Weight: 66.3 kg (146 lb 2.6 oz)   Body mass index is 29.86 kg/m².      Physical Exam  Constitutional: NAD, alert, pleasant  Respiratory: CTAB, no wheezes, rales or rhonchi. No accessory muscle use  Eyes: EOMI  Cardiovascular: RRR, No m/r/g. No JVD. No LE edema  Gastrointestinal: BS+, nontender, nondistended  Integumentary: warm, dry, intact. Right arm erythema has resolved.  Packing in place. NO odor. Wound was repacked with 1/4" iodoform gauze.   Psych: AA&Ox3        1. Abscess of right arm     -wound repacked. Return 2 days to repack  - keep " clean and dry. No swimming  - rtc 2 days to repack      Follow up: No follow-ups on file.      Care Plan/Goals: Reviewed   Goals    None

## 2022-06-24 ENCOUNTER — OFFICE VISIT (OUTPATIENT)
Dept: FAMILY MEDICINE | Facility: CLINIC | Age: 27
End: 2022-06-24
Payer: MEDICAID

## 2022-06-24 VITALS
HEART RATE: 80 BPM | DIASTOLIC BLOOD PRESSURE: 68 MMHG | WEIGHT: 146 LBS | BODY MASS INDEX: 29.43 KG/M2 | SYSTOLIC BLOOD PRESSURE: 100 MMHG | RESPIRATION RATE: 18 BRPM | OXYGEN SATURATION: 99 % | TEMPERATURE: 97 F | HEIGHT: 59 IN

## 2022-06-24 DIAGNOSIS — L02.91 ABSCESS: Primary | ICD-10-CM

## 2022-06-24 PROCEDURE — 99024 POSTOP FOLLOW-UP VISIT: CPT | Mod: ,,, | Performed by: FAMILY MEDICINE

## 2022-06-24 PROCEDURE — 3008F BODY MASS INDEX DOCD: CPT | Mod: CPTII,,, | Performed by: FAMILY MEDICINE

## 2022-06-24 PROCEDURE — 3008F PR BODY MASS INDEX (BMI) DOCUMENTED: ICD-10-PCS | Mod: CPTII,,, | Performed by: FAMILY MEDICINE

## 2022-06-24 PROCEDURE — 3074F SYST BP LT 130 MM HG: CPT | Mod: CPTII,,, | Performed by: FAMILY MEDICINE

## 2022-06-24 PROCEDURE — 99024 PR POST-OP FOLLOW-UP VISIT: ICD-10-PCS | Mod: ,,, | Performed by: FAMILY MEDICINE

## 2022-06-24 PROCEDURE — 3078F DIAST BP <80 MM HG: CPT | Mod: CPTII,,, | Performed by: FAMILY MEDICINE

## 2022-06-24 PROCEDURE — 1159F PR MEDICATION LIST DOCUMENTED IN MEDICAL RECORD: ICD-10-PCS | Mod: CPTII,,, | Performed by: FAMILY MEDICINE

## 2022-06-24 PROCEDURE — 1160F PR REVIEW ALL MEDS BY PRESCRIBER/CLIN PHARMACIST DOCUMENTED: ICD-10-PCS | Mod: CPTII,,, | Performed by: FAMILY MEDICINE

## 2022-06-24 PROCEDURE — 1160F RVW MEDS BY RX/DR IN RCRD: CPT | Mod: CPTII,,, | Performed by: FAMILY MEDICINE

## 2022-06-24 PROCEDURE — 3078F PR MOST RECENT DIASTOLIC BLOOD PRESSURE < 80 MM HG: ICD-10-PCS | Mod: CPTII,,, | Performed by: FAMILY MEDICINE

## 2022-06-24 PROCEDURE — 1159F MED LIST DOCD IN RCRD: CPT | Mod: CPTII,,, | Performed by: FAMILY MEDICINE

## 2022-06-24 PROCEDURE — 3074F PR MOST RECENT SYSTOLIC BLOOD PRESSURE < 130 MM HG: ICD-10-PCS | Mod: CPTII,,, | Performed by: FAMILY MEDICINE

## 2022-06-24 NOTE — PROGRESS NOTES
"Adrienne Caba  06/24/2022  [id number]    Marcia Garrido MD  Patient Care Team:  Marcia Mckeon MD as PCP - General (Family Medicine)      Chief Complaint:  Chief Complaint   Patient presents with    Wound Check       History of Present Illness:  Wound Check        27 y.o. female who presents today for wound recheck. It was repacked 2 days ago and is improving significantly.  She completed a course bactrim ds. Wound culture grew out MRSA sensitive to bactrim. Denies f/c, night sweats.     Review of Systems    General: denies f/c, weight loss, night sweats, decreased appetite  Eye: denies blurred vision, changes in vision  Respiratory: denies sob, wheezing, cough  Cardiovascular: denies chest pain, palpitations, edema  Gastrointestinal: denies abdominal pain, n/v, constipation, diarrhea  Integumentary: denies rashes, pruritis      Health Maintenance  Health Maintenance Topics with due status: Not Due       Topic Last Completion Date    TETANUS VACCINE 10/31/2019    Influenza Vaccine 10/31/2019    Pap Smear 01/26/2021     Health Maintenance Due   Topic Date Due    Hepatitis C Screening  Never done    Lipid Panel  Never done    COVID-19 Vaccine (1) Never done    Pneumococcal Vaccines (Age 0-64) (2 - PPSV23 or PCV20) 01/22/2003    HIV Screening  Never done       Exam:  Vitals:    06/24/22 0805   BP: 100/68   Pulse: 80   Resp: 18   Temp: 96.8 °F (36 °C)     Weight: 66.2 kg (146 lb)   Body mass index is 29.83 kg/m².      Physical Exam  Constitutional: NAD, alert, pleasant  Respiratory: CTAB, no wheezes, rales or rhonchi. No accessory muscle use  Eyes: EOMI  Cardiovascular: RRR, No m/r/g. No JVD. No LE edema  Gastrointestinal: BS+, nontender, nondistended  Integumentary: warm, dry, intact. Right arm erythema has resolved.  Packing in place. NO odor. Wound was repacked with 1/4" iodoform gauze.   Psych: AA&Ox3        1. Abscess     -wound repacked. Remove gauze Sunday evening and keep covered, clean " and dry until fully healed.   - keep clean and dry. No swimming  - rtc 2 days to repack      Follow up: Follow up if symptoms worsen or fail to improve.      Care Plan/Goals: Reviewed   Goals    None

## 2022-08-25 ENCOUNTER — DOCUMENTATION ONLY (OUTPATIENT)
Dept: ADMINISTRATIVE | Facility: HOSPITAL | Age: 27
End: 2022-08-25
Payer: MEDICAID

## 2022-11-07 ENCOUNTER — HOSPITAL ENCOUNTER (EMERGENCY)
Facility: HOSPITAL | Age: 27
Discharge: HOME | End: 2022-11-07
Attending: FAMILY MEDICINE
Payer: MEDICAID

## 2022-11-07 VITALS
OXYGEN SATURATION: 98 % | WEIGHT: 135 LBS | HEART RATE: 93 BPM | DIASTOLIC BLOOD PRESSURE: 77 MMHG | SYSTOLIC BLOOD PRESSURE: 124 MMHG | BODY MASS INDEX: 24.84 KG/M2 | HEIGHT: 62 IN | RESPIRATION RATE: 18 BRPM | TEMPERATURE: 98 F

## 2022-11-07 DIAGNOSIS — K59.00 CONSTIPATION, UNSPECIFIED CONSTIPATION TYPE: Primary | ICD-10-CM

## 2022-11-07 DIAGNOSIS — R10.32 LLQ ABDOMINAL PAIN: ICD-10-CM

## 2022-11-07 LAB
ALBUMIN SERPL-MCNC: 3.7 GM/DL (ref 3.5–5)
ALBUMIN/GLOB SERPL: 1 RATIO (ref 1.1–2)
ALP SERPL-CCNC: 86 UNIT/L (ref 40–150)
ALT SERPL-CCNC: 19 UNIT/L (ref 0–55)
APPEARANCE UR: CLEAR
AST SERPL-CCNC: 15 UNIT/L (ref 5–34)
B-HCG SERPL QL: NEGATIVE
BACTERIA #/AREA URNS AUTO: NORMAL /HPF
BASOPHILS # BLD AUTO: 0.02 X10(3)/MCL (ref 0–0.2)
BASOPHILS NFR BLD AUTO: 0.4 %
BILIRUB UR QL STRIP.AUTO: NEGATIVE MG/DL
BILIRUBIN DIRECT+TOT PNL SERPL-MCNC: 0.3 MG/DL
BUN SERPL-MCNC: 9 MG/DL (ref 7–18.7)
CALCIUM SERPL-MCNC: 9.6 MG/DL (ref 8.4–10.2)
CHLORIDE SERPL-SCNC: 107 MMOL/L (ref 98–107)
CO2 SERPL-SCNC: 25 MMOL/L (ref 22–29)
COLOR UR AUTO: YELLOW
CREAT SERPL-MCNC: 0.69 MG/DL (ref 0.55–1.02)
EOSINOPHIL # BLD AUTO: 0.1 X10(3)/MCL (ref 0–0.9)
EOSINOPHIL NFR BLD AUTO: 1.9 %
ERYTHROCYTE [DISTWIDTH] IN BLOOD BY AUTOMATED COUNT: 12.5 % (ref 11.5–17)
GFR SERPLBLD CREATININE-BSD FMLA CKD-EPI: >60 MLS/MIN/1.73/M2
GLOBULIN SER-MCNC: 3.8 GM/DL (ref 2.4–3.5)
GLUCOSE SERPL-MCNC: 86 MG/DL (ref 74–100)
GLUCOSE UR QL STRIP.AUTO: NEGATIVE MG/DL
HCT VFR BLD AUTO: 40.3 % (ref 37–47)
HGB BLD-MCNC: 13.6 GM/DL (ref 12–16)
IMM GRANULOCYTES # BLD AUTO: 0.02 X10(3)/MCL (ref 0–0.04)
IMM GRANULOCYTES NFR BLD AUTO: 0.4 %
KETONES UR QL STRIP.AUTO: NEGATIVE MG/DL
LEUKOCYTE ESTERASE UR QL STRIP.AUTO: NEGATIVE UNIT/L
LYMPHOCYTES # BLD AUTO: 1.85 X10(3)/MCL (ref 0.6–4.6)
LYMPHOCYTES NFR BLD AUTO: 35 %
MCH RBC QN AUTO: 29.2 PG (ref 27–31)
MCHC RBC AUTO-ENTMCNC: 33.7 MG/DL (ref 33–36)
MCV RBC AUTO: 86.5 FL (ref 80–94)
MONOCYTES # BLD AUTO: 0.29 X10(3)/MCL (ref 0.1–1.3)
MONOCYTES NFR BLD AUTO: 5.5 %
NEUTROPHILS # BLD AUTO: 3 X10(3)/MCL (ref 2.1–9.2)
NEUTROPHILS NFR BLD AUTO: 56.8 %
NITRITE UR QL STRIP.AUTO: NEGATIVE
NRBC BLD AUTO-RTO: 0 %
PH UR STRIP.AUTO: 7 [PH]
PLATELET # BLD AUTO: 252 X10(3)/MCL (ref 130–400)
PMV BLD AUTO: 9.6 FL (ref 7.4–10.4)
POTASSIUM SERPL-SCNC: 3.9 MMOL/L (ref 3.5–5.1)
PROT SERPL-MCNC: 7.5 GM/DL (ref 6.4–8.3)
PROT UR QL STRIP.AUTO: NEGATIVE MG/DL
RBC # BLD AUTO: 4.66 X10(6)/MCL (ref 4.2–5.4)
RBC #/AREA URNS AUTO: NORMAL /HPF
RBC UR QL AUTO: NEGATIVE UNIT/L
SODIUM SERPL-SCNC: 142 MMOL/L (ref 136–145)
SP GR UR STRIP.AUTO: 1.02
SQUAMOUS #/AREA URNS AUTO: NORMAL /HPF
UROBILINOGEN UR STRIP-ACNC: 0.2 MG/DL
WBC # SPEC AUTO: 5.3 X10(3)/MCL (ref 4.5–11.5)
WBC #/AREA URNS AUTO: NORMAL /HPF

## 2022-11-07 PROCEDURE — 85025 COMPLETE CBC W/AUTO DIFF WBC: CPT | Performed by: FAMILY MEDICINE

## 2022-11-07 PROCEDURE — 99284 EMERGENCY DEPT VISIT MOD MDM: CPT | Mod: 25

## 2022-11-07 PROCEDURE — 80053 COMPREHEN METABOLIC PANEL: CPT | Performed by: FAMILY MEDICINE

## 2022-11-07 PROCEDURE — 81025 URINE PREGNANCY TEST: CPT | Performed by: FAMILY MEDICINE

## 2022-11-07 PROCEDURE — 81001 URINALYSIS AUTO W/SCOPE: CPT | Performed by: FAMILY MEDICINE

## 2022-11-07 RX ORDER — KETOROLAC TROMETHAMINE 10 MG/1
10 TABLET, FILM COATED ORAL EVERY 6 HOURS
Qty: 20 TABLET | Refills: 0 | Status: SHIPPED | OUTPATIENT
Start: 2022-11-07 | End: 2022-11-12

## 2022-11-07 NOTE — ED PROVIDER NOTES
Encounter Date: 2022       History     Chief Complaint   Patient presents with    Abdominal Pain     Pt c/o LLQ abd pain since yest. Character stretching, burning, shocking.      This patient is a 27-year-old female who comes in with left lower quadrant abdominal pain.  Patient states she has been having the pain since yesterday as more of a burning shocking type of pain.  She denies any nausea vomiting or diarrhea and she states her last point bowel movement with this morning.    The history is provided by the patient.   Abdominal Pain  The current episode started yesterday. The onset of the illness was abrupt. The problem has not changed since onset.The abdominal pain is located in the LLQ. The abdominal pain radiates to the LLQ. The severity of the abdominal pain is 6/10. The abdominal pain is relieved by nothing. The other symptoms of the illness do not include fever or vomiting.   The patient states that she believes she is currently not pregnant. The patient has not had a change in bowel habit.   Review of patient's allergies indicates:   Allergen Reactions    Ampicillin Hives    Penicillin Rash and Nausea And Vomiting     Past Medical History:   Diagnosis Date    Amenorrhea, unspecified     Bacterial vaginosis     Chronic abdominal pain     Excessive daytime sleepiness     Free fluid in pelvis     Pelvic pain     Unspecified hemorrhoids     Urinary frequency      Past Surgical History:   Procedure Laterality Date     SECTION  09/15/2015     SECTION  2013    CHOLECYSTECTOMY  2011    HERNIA REPAIR  2018     Family History   Problem Relation Age of Onset    Diabetes Mellitus Mother     Hyperlipidemia Mother     Hypertension Father      Social History     Tobacco Use    Smoking status: Every Day     Packs/day: 1.00     Types: Cigarettes    Smokeless tobacco: Never   Substance Use Topics    Alcohol use: Never    Drug use: Never     Review of Systems   Constitutional: Negative.   Negative for fever.   HENT: Negative.     Respiratory: Negative.     Cardiovascular: Negative.    Gastrointestinal:  Positive for abdominal pain. Negative for vomiting.   Endocrine: Negative.    Genitourinary: Negative.    Musculoskeletal: Negative.    Neurological: Negative.    Psychiatric/Behavioral: Negative.     All other systems reviewed and are negative.    Physical Exam     Initial Vitals [11/07/22 1022]   BP Pulse Resp Temp SpO2   124/77 93 18 98.2 °F (36.8 °C) 98 %      MAP       --         Physical Exam    Nursing note and vitals reviewed.  Constitutional: She appears well-developed.   HENT:   Head: Normocephalic.   Eyes: Pupils are equal, round, and reactive to light.   Neck:   Normal range of motion.  Cardiovascular:  Normal rate, regular rhythm and normal heart sounds.           Pulmonary/Chest: Breath sounds normal.   Abdominal: Abdomen is soft and protuberant. There is abdominal tenderness in the left lower quadrant.       Musculoskeletal:         General: Normal range of motion.      Cervical back: Normal range of motion.     Neurological: She is alert and oriented to person, place, and time.   Skin: Skin is warm and dry.   Psychiatric: She has a normal mood and affect.       ED Course   Procedures  Labs Reviewed   COMPREHENSIVE METABOLIC PANEL - Abnormal; Notable for the following components:       Result Value    Globulin 3.8 (*)     Albumin/Globulin Ratio 1.0 (*)     All other components within normal limits   PREGNANCY TEST, URINE RAPID - Normal   URINALYSIS, MICROSCOPIC - Normal   URINALYSIS, REFLEX TO URINE CULTURE   CBC W/ AUTO DIFFERENTIAL    Narrative:     The following orders were created for panel order CBC auto differential.  Procedure                               Abnormality         Status                     ---------                               -----------         ------                     CBC with Differential[[id number]]                            Final result                  Please view results for these tests on the individual orders.   CBC WITH DIFFERENTIAL          Imaging Results              X-Ray Abdomen Flat And Erect (Final result)  Result time 11/07/22 14:02:32      Final result by Isaias Macias MD (11/07/22 14:02:32)                   Impression:      Nonobstructive bowel gas pattern.  Scattered stool noted throughout the colon as may be seen with constipation.      Electronically signed by: Isaias Macias  Date:    11/07/2022  Time:    14:02               Narrative:    EXAMINATION:  XR ABDOMEN FLAT AND ERECT    CLINICAL HISTORY:  Left lower quadrant pain    TECHNIQUE:  Flat and upright imaging of the abdomen    COMPARISON:  None    FINDINGS:  No acute osseous abnormality.  Nonobstructive bowel gas pattern.                                       Medications - No data to display  Medical Decision Making:   Initial Assessment:   Patient is a 27-year-old female who comes in with LLQ quadrant pain states that she is having normal bowel movements.  She denies any nausea vomiting or diarrhea this pain has been going on since yesterday  Differential Diagnosis:   Constipation, urinary tract infection  Clinical Tests:   Lab Tests: Ordered and Reviewed  Radiological Study: Ordered and Reviewed  ED Management:  Urine for this patient was normal.  Abdominal x-ray shows that she has constipation                        Clinical Impression:   Final diagnoses:  [R10.32] LLQ abdominal pain  [K59.00] Constipation, unspecified constipation type (Primary)      ED Disposition Condition    Discharge Stable          ED Prescriptions       Medication Sig Dispense Start Date End Date Auth. Provider    ketorolac (TORADOL) 10 mg tablet Take 1 tablet (10 mg total) by mouth every 6 (six) hours. for 5 days 20 tablet 11/7/2022 11/12/2022 June Ngo MD          Follow-up Information       Follow up With Specialties Details Why Contact Info    PCP  Schedule an appointment as soon as  possible for a visit in 2 days               June Ngo MD  11/07/22 [id number]

## 2022-11-07 NOTE — ED NOTES
Pt reports LLQ abd pain that started yesterday. States pain worse with movement, relieved with rest. ABD obese but soft. Slight tenderness to LLQ. Reports bowels moved today, denies diarrhea or constipation. Denies burning or pain with urination.

## 2023-05-08 ENCOUNTER — LAB VISIT (OUTPATIENT)
Dept: LAB | Facility: HOSPITAL | Age: 28
End: 2023-05-08
Attending: FAMILY MEDICINE
Payer: MEDICAID

## 2023-05-08 ENCOUNTER — OFFICE VISIT (OUTPATIENT)
Dept: FAMILY MEDICINE | Facility: CLINIC | Age: 28
End: 2023-05-08
Payer: MEDICAID

## 2023-05-08 VITALS
RESPIRATION RATE: 18 BRPM | WEIGHT: 154.81 LBS | OXYGEN SATURATION: 98 % | DIASTOLIC BLOOD PRESSURE: 78 MMHG | SYSTOLIC BLOOD PRESSURE: 117 MMHG | BODY MASS INDEX: 28.49 KG/M2 | TEMPERATURE: 98 F | HEART RATE: 74 BPM | HEIGHT: 62 IN

## 2023-05-08 DIAGNOSIS — R06.02 SHORTNESS OF BREATH: ICD-10-CM

## 2023-05-08 DIAGNOSIS — R10.32 PAIN, ABDOMINAL, LLQ: ICD-10-CM

## 2023-05-08 DIAGNOSIS — R06.02 SHORTNESS OF BREATH: Primary | ICD-10-CM

## 2023-05-08 DIAGNOSIS — M54.50 ACUTE LEFT-SIDED LOW BACK PAIN WITHOUT SCIATICA: ICD-10-CM

## 2023-05-08 LAB
ALBUMIN SERPL-MCNC: 3.9 G/DL (ref 3.5–5)
ALBUMIN/GLOB SERPL: 1.1 RATIO (ref 1.1–2)
ALP SERPL-CCNC: 91 UNIT/L (ref 40–150)
ALT SERPL-CCNC: 16 UNIT/L (ref 0–55)
AST SERPL-CCNC: 14 UNIT/L (ref 5–34)
B-HCG UR QL: NEGATIVE
BASOPHILS # BLD AUTO: 0.03 X10(3)/MCL
BASOPHILS NFR BLD AUTO: 0.5 %
BILIRUB SERPL-MCNC: ABNORMAL MG/DL
BILIRUBIN DIRECT+TOT PNL SERPL-MCNC: 0.8 MG/DL
BLOOD URINE, POC: ABNORMAL
BUN SERPL-MCNC: 8 MG/DL (ref 7–18.7)
CALCIUM SERPL-MCNC: 9.8 MG/DL (ref 8.4–10.2)
CHLORIDE SERPL-SCNC: 108 MMOL/L (ref 98–107)
CLARITY, POC UA: ABNORMAL
CO2 SERPL-SCNC: 25 MMOL/L (ref 22–29)
COLOR, POC UA: ABNORMAL
CREAT SERPL-MCNC: 0.8 MG/DL (ref 0.55–1.02)
CTP QC/QA: YES
EOSINOPHIL # BLD AUTO: 0.1 X10(3)/MCL (ref 0–0.9)
EOSINOPHIL NFR BLD AUTO: 1.5 %
ERYTHROCYTE [DISTWIDTH] IN BLOOD BY AUTOMATED COUNT: 12.7 % (ref 11.5–17)
GFR SERPLBLD CREATININE-BSD FMLA CKD-EPI: >60 MLS/MIN/1.73/M2
GLOBULIN SER-MCNC: 3.6 GM/DL (ref 2.4–3.5)
GLUCOSE SERPL-MCNC: 103 MG/DL (ref 74–100)
GLUCOSE UR QL STRIP: ABNORMAL
HCT VFR BLD AUTO: 42 % (ref 37–47)
HGB BLD-MCNC: 14.3 G/DL (ref 12–16)
IMM GRANULOCYTES # BLD AUTO: 0.02 X10(3)/MCL (ref 0–0.04)
IMM GRANULOCYTES NFR BLD AUTO: 0.3 %
KETONES UR QL STRIP: ABNORMAL
LEUKOCYTE ESTERASE URINE, POC: ABNORMAL
LYMPHOCYTES # BLD AUTO: 1.94 X10(3)/MCL (ref 0.6–4.6)
LYMPHOCYTES NFR BLD AUTO: 29.6 %
MCH RBC QN AUTO: 29.1 PG (ref 27–31)
MCHC RBC AUTO-ENTMCNC: 34 G/DL (ref 33–36)
MCV RBC AUTO: 85.4 FL (ref 80–94)
MONOCYTES # BLD AUTO: 0.45 X10(3)/MCL (ref 0.1–1.3)
MONOCYTES NFR BLD AUTO: 6.9 %
NEUTROPHILS # BLD AUTO: 4.01 X10(3)/MCL (ref 2.1–9.2)
NEUTROPHILS NFR BLD AUTO: 61.2 %
NITRITE, POC UA: ABNORMAL
NRBC BLD AUTO-RTO: 0 %
PH, POC UA: 6.5
PLATELET # BLD AUTO: 272 X10(3)/MCL (ref 130–400)
PMV BLD AUTO: 9.9 FL (ref 7.4–10.4)
POTASSIUM SERPL-SCNC: 3.8 MMOL/L (ref 3.5–5.1)
PROT SERPL-MCNC: 7.5 GM/DL (ref 6.4–8.3)
PROTEIN, POC: ABNORMAL
RBC # BLD AUTO: 4.92 X10(6)/MCL (ref 4.2–5.4)
SODIUM SERPL-SCNC: 140 MMOL/L (ref 136–145)
SPECIFIC GRAVITY, POC UA: 1.02
UROBILINOGEN, POC UA: 1
WBC # SPEC AUTO: 6.55 X10(3)/MCL (ref 4.5–11.5)

## 2023-05-08 PROCEDURE — 85025 COMPLETE CBC W/AUTO DIFF WBC: CPT

## 2023-05-08 PROCEDURE — 1159F PR MEDICATION LIST DOCUMENTED IN MEDICAL RECORD: ICD-10-PCS | Mod: CPTII,,, | Performed by: FAMILY MEDICINE

## 2023-05-08 PROCEDURE — 99214 PR OFFICE/OUTPT VISIT, EST, LEVL IV, 30-39 MIN: ICD-10-PCS | Mod: ,,, | Performed by: FAMILY MEDICINE

## 2023-05-08 PROCEDURE — 80053 COMPREHEN METABOLIC PANEL: CPT

## 2023-05-08 PROCEDURE — 99214 OFFICE O/P EST MOD 30 MIN: CPT | Mod: ,,, | Performed by: FAMILY MEDICINE

## 2023-05-08 PROCEDURE — 1160F RVW MEDS BY RX/DR IN RCRD: CPT | Mod: CPTII,,, | Performed by: FAMILY MEDICINE

## 2023-05-08 PROCEDURE — 3074F PR MOST RECENT SYSTOLIC BLOOD PRESSURE < 130 MM HG: ICD-10-PCS | Mod: CPTII,,, | Performed by: FAMILY MEDICINE

## 2023-05-08 PROCEDURE — 81025 POCT URINE PREGNANCY: ICD-10-PCS | Mod: ,,, | Performed by: FAMILY MEDICINE

## 2023-05-08 PROCEDURE — 81002 URINALYSIS NONAUTO W/O SCOPE: CPT | Mod: ,,, | Performed by: FAMILY MEDICINE

## 2023-05-08 PROCEDURE — 81002 POCT URINE DIPSTICK WITHOUT MICROSCOPE: ICD-10-PCS | Mod: ,,, | Performed by: FAMILY MEDICINE

## 2023-05-08 PROCEDURE — 81025 URINE PREGNANCY TEST: CPT | Mod: ,,, | Performed by: FAMILY MEDICINE

## 2023-05-08 PROCEDURE — 3008F PR BODY MASS INDEX (BMI) DOCUMENTED: ICD-10-PCS | Mod: CPTII,,, | Performed by: FAMILY MEDICINE

## 2023-05-08 PROCEDURE — 3078F DIAST BP <80 MM HG: CPT | Mod: CPTII,,, | Performed by: FAMILY MEDICINE

## 2023-05-08 PROCEDURE — 36415 COLL VENOUS BLD VENIPUNCTURE: CPT

## 2023-05-08 PROCEDURE — 3008F BODY MASS INDEX DOCD: CPT | Mod: CPTII,,, | Performed by: FAMILY MEDICINE

## 2023-05-08 PROCEDURE — 1160F PR REVIEW ALL MEDS BY PRESCRIBER/CLIN PHARMACIST DOCUMENTED: ICD-10-PCS | Mod: CPTII,,, | Performed by: FAMILY MEDICINE

## 2023-05-08 PROCEDURE — 1159F MED LIST DOCD IN RCRD: CPT | Mod: CPTII,,, | Performed by: FAMILY MEDICINE

## 2023-05-08 PROCEDURE — 3078F PR MOST RECENT DIASTOLIC BLOOD PRESSURE < 80 MM HG: ICD-10-PCS | Mod: CPTII,,, | Performed by: FAMILY MEDICINE

## 2023-05-08 PROCEDURE — 3074F SYST BP LT 130 MM HG: CPT | Mod: CPTII,,, | Performed by: FAMILY MEDICINE

## 2023-05-08 RX ORDER — DICLOFENAC SODIUM 75 MG/1
75 TABLET, DELAYED RELEASE ORAL 2 TIMES DAILY
Qty: 20 TABLET | Refills: 0 | Status: SHIPPED | OUTPATIENT
Start: 2023-05-08 | End: 2023-05-16 | Stop reason: ALTCHOICE

## 2023-05-08 RX ORDER — ALBUTEROL SULFATE 90 UG/1
2 AEROSOL, METERED RESPIRATORY (INHALATION) EVERY 4 HOURS PRN
Qty: 6.7 G | Refills: 0 | Status: SHIPPED | OUTPATIENT
Start: 2023-05-08 | End: 2023-05-16 | Stop reason: ALTCHOICE

## 2023-05-08 NOTE — PROGRESS NOTES
Patient ID: [id number]     Chief Complaint: Back Pain, Abdominal Pain, and Shortness of Breath (When exposed to smoke and strong scents pt becomes SOB )        HPI:     Adrienne Caba is a 28 y.o. female here today for Back Pain, Abdominal Pain, and Shortness of Breath (When exposed to smoke and strong scents pt becomes SOB ). Pain in back and lower abdomen has been present for the past week. Sometimes the pain is bilateral but primarily it is on the left side. Pain not relieved by bowel movements. Patient has not had this pain before. She states she had a negative pregnancy test 4 days ago. Not currently menstruating. Pain is described as a pressure in the back and in the pelvis it is a combination of pressure and stabbing. She has tried tylenol and ibuprofen for the pain with some relief in symptoms. Has also tried Azo and miralax.       ----------------------------  Amenorrhea, unspecified  Bacterial vaginosis  Chronic abdominal pain  Excessive daytime sleepiness  Free fluid in pelvis  Pelvic pain  Unspecified hemorrhoids  Urinary frequency     Past Surgical History:   Procedure Laterality Date     SECTION  09/15/2015     SECTION  2013    CHOLECYSTECTOMY  2011    HERNIA REPAIR  2018       Review of patient's allergies indicates:   Allergen Reactions    Ampicillin Hives    Penicillin Rash and Nausea And Vomiting       No outpatient medications have been marked as taking for the 23 encounter (Office Visit) with Tru Corley DO.       Social History     Socioeconomic History    Marital status: Single   Tobacco Use    Smoking status: Some Days     Packs/day: 1.00     Types: Cigarettes, Vaping with nicotine    Smokeless tobacco: Never   Substance and Sexual Activity    Alcohol use: Not Currently    Drug use: Never    Sexual activity: Yes     Partners: Male     Birth control/protection: None     Social Determinants of Health     Financial Resource Strain: Low Risk      Difficulty of Paying Living Expenses: Not hard at all   Food Insecurity: No Food Insecurity    Worried About Running Out of Food in the Last Year: Never true    Ran Out of Food in the Last Year: Never true   Transportation Needs: No Transportation Needs    Lack of Transportation (Medical): No    Lack of Transportation (Non-Medical): No   Physical Activity: Sufficiently Active    Days of Exercise per Week: 7 days    Minutes of Exercise per Session: 30 min   Stress: Stress Concern Present    Feeling of Stress : To some extent   Social Connections: Moderately Integrated    Frequency of Communication with Friends and Family: More than three times a week    Frequency of Social Gatherings with Friends and Family: More than three times a week    Attends [employer] Services: 1 to 4 times per year    Active Member of Clubs or Organizations: Yes    Attends Club or Organization Meetings: Never    Marital Status: Never    Housing Stability: Low Risk     Unable to Pay for Housing in the Last Year: No    Number of Places Lived in the Last Year: 1    Unstable Housing in the Last Year: No        Family History   Problem Relation Age of Onset    Diabetes Mellitus Mother     Hyperlipidemia Mother     Diabetes Mother     Hypertension Father     Learning disabilities Daughter     Learning disabilities Son         Patient Care Team:  Tru Corley DO as PCP - General (Family Medicine)  Mike Joseph MD (Obstetrics and Gynecology)  Jimenez August MD as Consulting Physician (Obstetrics and Gynecology)     Subjective:     Review of Systems   Constitutional:  Negative for chills and fever.   Respiratory:  Positive for cough, shortness of breath and wheezing. Negative for sputum production.    Cardiovascular:  Positive for chest pain. Negative for palpitations.   Gastrointestinal:  Positive for abdominal pain. Negative for constipation and diarrhea.   Genitourinary:  Negative for dysuria, hematuria and urgency.  "  Musculoskeletal:  Positive for back pain.   Neurological:  Negative for dizziness and headaches.   Psychiatric/Behavioral:  The patient does not have insomnia.      See HPI for details  All Other ROS: Negative except as stated in HPI.       Objective:     /78   Pulse 74   Temp 97.7 °F (36.5 °C) (Tympanic)   Resp 18   Ht 5' 1.81" (1.57 m)   Wt 70.2 kg (154 lb 12.8 oz)   LMP 03/11/2023   SpO2 98%   BMI 28.49 kg/m²     Physical Exam  Vitals reviewed.   Constitutional:       General: She is not in acute distress.     Appearance: Normal appearance.   Cardiovascular:      Rate and Rhythm: Normal rate and regular rhythm.      Heart sounds: No murmur heard.    No friction rub. No gallop.   Pulmonary:      Effort: No respiratory distress.      Breath sounds: No wheezing, rhonchi or rales.   Musculoskeletal:         General: Tenderness present. No swelling or deformity.      Right lower leg: No edema.      Left lower leg: No edema.      Comments: Tenderness to palpation of Left lumbar region and left pelvis near the inguinal ligament.    Skin:     General: Skin is warm and dry.      Findings: No lesion or rash.   Neurological:      General: No focal deficit present.      Mental Status: She is alert.   Psychiatric:         Mood and Affect: Mood normal.       Assessment/Plan:     1. Shortness of breath  -     CBC Auto Differential; Future; Expected date: 05/08/2023  -     albuterol (VENTOLIN HFA) 90 mcg/actuation inhaler; Inhale 2 puffs into the lungs every 4 (four) hours as needed for Wheezing or Shortness of Breath. Rescue  Dispense: 6.7 g; Refill: 0    2. Acute left-sided low back pain without sciatica  -     POCT URINE DIPSTICK WITHOUT MICROSCOPE  -     CBC Auto Differential; Future; Expected date: 05/08/2023  -     Comprehensive Metabolic Panel; Future; Expected date: 05/08/2023  -     POCT Urine Pregnancy  -     US Abdomen Limited; Future; Expected date: 05/08/2023  -     diclofenac (VOLTAREN) 75 MG EC " tablet; Take 1 tablet (75 mg total) by mouth 2 (two) times daily. for 10 days  Dispense: 20 tablet; Refill: 0    3. Pain, abdominal, LLQ  -     POCT URINE DIPSTICK WITHOUT MICROSCOPE  -     CBC Auto Differential; Future; Expected date: 05/08/2023  -     Comprehensive Metabolic Panel; Future; Expected date: 05/08/2023  -     POCT Urine Pregnancy  -     US Abdomen Limited; Future; Expected date: 05/08/2023  -     diclofenac (VOLTAREN) 75 MG EC tablet; Take 1 tablet (75 mg total) by mouth 2 (two) times daily. for 10 days  Dispense: 20 tablet; Refill: 0          Follow up:     Follow up if symptoms worsen or fail to improve. In addition to their scheduled follow up, the patient has also been instructed to follow up on as needed basis.

## 2023-05-16 ENCOUNTER — HOSPITAL ENCOUNTER (OUTPATIENT)
Dept: RADIOLOGY | Facility: HOSPITAL | Age: 28
Discharge: HOME | End: 2023-05-16
Attending: FAMILY MEDICINE
Payer: MEDICAID

## 2023-05-16 ENCOUNTER — HOSPITAL ENCOUNTER (EMERGENCY)
Facility: HOSPITAL | Age: 28
Discharge: HOME | End: 2023-05-16
Attending: EMERGENCY MEDICINE
Payer: MEDICAID

## 2023-05-16 VITALS
SYSTOLIC BLOOD PRESSURE: 120 MMHG | TEMPERATURE: 98 F | HEIGHT: 59 IN | HEART RATE: 80 BPM | BODY MASS INDEX: 31.25 KG/M2 | WEIGHT: 155 LBS | OXYGEN SATURATION: 99 % | DIASTOLIC BLOOD PRESSURE: 85 MMHG | RESPIRATION RATE: 18 BRPM

## 2023-05-16 DIAGNOSIS — R10.32 PAIN, ABDOMINAL, LLQ: ICD-10-CM
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
## 2023-05-16 DIAGNOSIS — M54.50 ACUTE LEFT-SIDED LOW BACK PAIN WITHOUT SCIATICA: ICD-10-CM

## 2023-05-16 DIAGNOSIS — S46.911A REPETITIVE STRAIN INJURY OF RIGHT ELBOW, INITIAL ENCOUNTER: Primary | ICD-10-CM

## 2023-05-16 DIAGNOSIS — T14.90XA TRAUMA: ICD-10-CM

## 2023-05-16 LAB — B-HCG SERPL QL: NEGATIVE

## 2023-05-16 PROCEDURE — 81025 URINE PREGNANCY TEST: CPT | Performed by: EMERGENCY MEDICINE

## 2023-05-16 PROCEDURE — 76705 ECHO EXAM OF ABDOMEN: CPT | Mod: TC

## 2023-05-16 PROCEDURE — 63600175 PHARM REV CODE 636 W HCPCS: Performed by: EMERGENCY MEDICINE

## 2023-05-16 PROCEDURE — 96372 THER/PROPH/DIAG INJ SC/IM: CPT | Performed by: EMERGENCY MEDICINE

## 2023-05-16 PROCEDURE — 99284 EMERGENCY DEPT VISIT MOD MDM: CPT | Mod: 25

## 2023-05-16 RX ORDER — KETOROLAC TROMETHAMINE 10 MG/1
10 TABLET, FILM COATED ORAL EVERY 8 HOURS PRN
Qty: 14 TABLET | Refills: 0 | Status: SHIPPED | OUTPATIENT
Start: 2023-05-16 | End: 2024-03-12

## 2023-05-16 RX ORDER — KETOROLAC TROMETHAMINE 30 MG/ML
30 INJECTION, SOLUTION INTRAMUSCULAR; INTRAVENOUS
Status: COMPLETED | OUTPATIENT
Start: 2023-05-16 | End: 2023-05-16

## 2023-05-16 RX ADMIN — KETOROLAC TROMETHAMINE 30 MG: 30 INJECTION, SOLUTION INTRAMUSCULAR at 06:05

## 2023-05-16 NOTE — ED PROVIDER NOTES
Encounter Date: 2023       History     Chief Complaint   Patient presents with    Joint Swelling     Right elbow pain started today     Patient is a 20-year-old female presenting with complain of right elbow pain.  Patient states this started after she got angry and punched a wall.  She complains of pain to the lateral aspect of the right elbow.  No other complaints.  Patient denies any hand pain or wrist pain.    Review of patient's allergies indicates:   Allergen Reactions    Ampicillin Hives    Penicillin Rash and Nausea And Vomiting     Past Medical History:   Diagnosis Date    Amenorrhea, unspecified     Bacterial vaginosis     Chronic abdominal pain     Excessive daytime sleepiness     Free fluid in pelvis     Pelvic pain     Unspecified hemorrhoids     Urinary frequency      Past Surgical History:   Procedure Laterality Date     SECTION  09/15/2015     SECTION  2013    CHOLECYSTECTOMY  2011    HERNIA REPAIR  2018     Family History   Problem Relation Age of Onset    Diabetes Mellitus Mother     Hyperlipidemia Mother     Diabetes Mother     Hypertension Father     Learning disabilities Daughter     Learning disabilities Son      Social History     Tobacco Use    Smoking status: Some Days     Packs/day: 1.00     Types: Cigarettes, Vaping with nicotine    Smokeless tobacco: Never   Substance Use Topics    Alcohol use: Not Currently    Drug use: Never     Review of Systems   Constitutional: Negative.    Respiratory: Negative.     Cardiovascular: Negative.    Gastrointestinal: Negative.    Genitourinary: Negative.    Musculoskeletal:  Positive for arthralgias and myalgias. Negative for back pain and joint swelling.   Skin: Negative.    Neurological: Negative.    Psychiatric/Behavioral:  Negative for suicidal ideas.      Physical Exam     Initial Vitals [23 1726]   BP Pulse Resp Temp SpO2   117/86 82 18 97.9 °F (36.6 °C) 99 %      MAP       --         Physical Exam    Nursing  note and vitals reviewed.  Constitutional: She appears well-developed and well-nourished.   HENT:   Head: Normocephalic.   Cardiovascular:  Normal rate, regular rhythm and normal heart sounds.           Pulmonary/Chest: Breath sounds normal.   Musculoskeletal:         General: Normal range of motion.      Comments: Patient has mild tenderness to palpation to the lateral aspect of the right elbow.  No deformity.  Patient has no pain on passive range of motion of the right upper extremity.  No effusion apparent.     Neurological: She is alert and oriented to person, place, and time. She has normal strength.   Skin: Skin is warm.       ED Course   Procedures  Labs Reviewed   PREGNANCY TEST, URINE RAPID - Normal          Imaging Results              X-Ray Elbow Complete Right (Final result)  Result time 05/16/23 18:00:06      Final result by Joe Quintanilla MD (05/16/23 18:00:06)                   Impression:      There is no abnormality seen      Electronically signed by: Joe Quintanilla  Date:    05/16/2023  Time:    18:00               Narrative:    EXAMINATION:  XR ELBOW COMPLETE 3 VIEW RIGHT    CLINICAL HISTORY:  . Injury, unspecified, initial encounter    TECHNIQUE:  AP, lateral, and oblique views of the right elbow were performed.    COMPARISON:  None available    FINDINGS:  The bones and joints are in good anatomic alignment.  No fracture is seen.  No dislocation is seen.  No soft tissue abnormality is seen.                                    X-Rays:   Independently Interpreted Readings:   Other Readings:  No acute changes seen on x-rays of the right elbow  Medications   ketorolac injection 30 mg (has no administration in time range)     Medical Decision Making:   Initial Assessment:   As per HPI  Differential Diagnosis:   Radial head fracture, elbow strain  Clinical Tests:   Lab Tests: Ordered and Reviewed       <> Summary of Lab: UPT is negative  Radiological Study: Ordered and Reviewed  ED  Management:  X-rays are negative for fracture.  Patient will be discharged with a prescription of Toradol.  UPT is negative                        Clinical Impression:   Final diagnoses:  [T14.90XA] Trauma  [S46.911A, X50.3XXA] Repetitive strain injury of right elbow, initial encounter (Primary)        ED Disposition Condition    Discharge Stable          ED Prescriptions       Medication Sig Dispense Start Date End Date Auth. Provider    ketorolac (TORADOL) 10 mg tablet Take 1 tablet (10 mg total) by mouth every 8 (eight) hours as needed for Pain. 14 tablet 5/16/2023 -- Chacorta Chung MD          Follow-up Information    None          Chacorta Chung MD  05/16/23 6215       Chacorta Chung MD  05/16/23 [id number]

## 2023-05-16 NOTE — ED NOTES
Pt ambulated to room 3 with steady gait. Pt complains of right elbow pain that started today. Pt states pain is 9/10. Pt is resting comfortably in bed watching tv. Pt denies needs at this time.

## 2023-05-22 ENCOUNTER — TELEPHONE (OUTPATIENT)
Dept: FAMILY MEDICINE | Facility: CLINIC | Age: 28
End: 2023-05-22
Payer: MEDICAID

## 2023-05-22 NOTE — TELEPHONE ENCOUNTER
----- Message from Tru Corley DO sent at 5/20/2023  9:29 AM CDT -----  I have reviewed the imaging results. There are no significant abnormalities noted.

## 2023-11-04 ENCOUNTER — HOSPITAL ENCOUNTER (EMERGENCY)
Facility: HOSPITAL | Age: 28
Discharge: HOME | End: 2023-11-04
Attending: STUDENT IN AN ORGANIZED HEALTH CARE EDUCATION/TRAINING PROGRAM
Payer: MEDICAID

## 2023-11-04 VITALS
TEMPERATURE: 100 F | HEART RATE: 110 BPM | DIASTOLIC BLOOD PRESSURE: 78 MMHG | HEIGHT: 59 IN | SYSTOLIC BLOOD PRESSURE: 117 MMHG | OXYGEN SATURATION: 98 % | RESPIRATION RATE: 16 BRPM | BODY MASS INDEX: 30.24 KG/M2 | WEIGHT: 150 LBS

## 2023-11-04 DIAGNOSIS — J10.1 INFLUENZA A: Primary | ICD-10-CM

## 2023-11-04 LAB
B-HCG SERPL QL: NEGATIVE
FLUAV AG UPPER RESP QL IA.RAPID: DETECTED
FLUBV AG UPPER RESP QL IA.RAPID: NOT DETECTED
SARS-COV-2 RNA RESP QL NAA+PROBE: NOT DETECTED
STREP A PCR (OHS): NOT DETECTED

## 2023-11-04 PROCEDURE — 96372 THER/PROPH/DIAG INJ SC/IM: CPT | Performed by: STUDENT IN AN ORGANIZED HEALTH CARE EDUCATION/TRAINING PROGRAM

## 2023-11-04 PROCEDURE — 0240U COVID/FLU A&B PCR: CPT | Performed by: STUDENT IN AN ORGANIZED HEALTH CARE EDUCATION/TRAINING PROGRAM

## 2023-11-04 PROCEDURE — 87651 STREP A DNA AMP PROBE: CPT | Performed by: STUDENT IN AN ORGANIZED HEALTH CARE EDUCATION/TRAINING PROGRAM

## 2023-11-04 PROCEDURE — 99284 EMERGENCY DEPT VISIT MOD MDM: CPT | Mod: 25

## 2023-11-04 PROCEDURE — 81025 URINE PREGNANCY TEST: CPT | Performed by: STUDENT IN AN ORGANIZED HEALTH CARE EDUCATION/TRAINING PROGRAM

## 2023-11-04 PROCEDURE — 63600175 PHARM REV CODE 636 W HCPCS: Performed by: STUDENT IN AN ORGANIZED HEALTH CARE EDUCATION/TRAINING PROGRAM

## 2023-11-04 RX ORDER — KETOROLAC TROMETHAMINE 30 MG/ML
30 INJECTION, SOLUTION INTRAMUSCULAR; INTRAVENOUS
Status: COMPLETED | OUTPATIENT
Start: 2023-11-04 | End: 2023-11-04

## 2023-11-04 RX ORDER — OSELTAMIVIR PHOSPHATE 75 MG/1
75 CAPSULE ORAL 2 TIMES DAILY
Qty: 10 CAPSULE | Refills: 0 | Status: SHIPPED | OUTPATIENT
Start: 2023-11-04 | End: 2023-11-09

## 2023-11-04 RX ADMIN — KETOROLAC TROMETHAMINE 30 MG: 30 INJECTION, SOLUTION INTRAMUSCULAR; INTRAVENOUS at 09:11

## 2023-11-05 NOTE — ED PROVIDER NOTES
"Encounter Date: 2023       History     Chief Complaint   Patient presents with    Sore Throat     C/o sore throat since this morning. Pt states her "whole body hurts". Denies fever. AAOx4.      Patient is a 28-year-old white female no significant past medical history presented to the ER today due to cough, congestion, myalgias and sore throat for 1 day.  Patient denies any fevers taking anything for symptom relief.  Patient also presents with a daughter with similar symptoms for the same timeframe.  Denies any shortness of breath abdominal pain, diarrhea, constipation, dysuria.      Review of patient's allergies indicates:   Allergen Reactions    Ampicillin Hives    Penicillin Rash and Nausea And Vomiting     Past Medical History:   Diagnosis Date    Amenorrhea, unspecified     Bacterial vaginosis     Chronic abdominal pain     Excessive daytime sleepiness     Free fluid in pelvis     Pelvic pain     Unspecified hemorrhoids     Urinary frequency      Past Surgical History:   Procedure Laterality Date     SECTION  09/15/2015     SECTION  2013    CHOLECYSTECTOMY  2011    HERNIA REPAIR  2018     Family History   Problem Relation Age of Onset    Diabetes Mellitus Mother     Hyperlipidemia Mother     Diabetes Mother     Hypertension Father     Learning disabilities Daughter     Learning disabilities Son      Social History     Tobacco Use    Smoking status: Some Days     Current packs/day: 1.00     Types: Cigarettes, Vaping with nicotine    Smokeless tobacco: Never   Substance Use Topics    Alcohol use: Not Currently    Drug use: Never     Review of Systems   Constitutional:  Negative for chills, fatigue and fever.   HENT:  Positive for congestion and sore throat. Negative for trouble swallowing.    Eyes:  Negative for pain and visual disturbance.   Respiratory:  Negative for cough, shortness of breath and wheezing.    Cardiovascular:  Negative for palpitations.   Gastrointestinal:  " Negative for abdominal pain, blood in stool, constipation, diarrhea, nausea and vomiting.   Genitourinary:  Negative for dysuria and hematuria.   Musculoskeletal:  Positive for myalgias. Negative for back pain.   Skin:  Negative for rash and wound.   Neurological:  Negative for seizures, syncope and headaches.   Psychiatric/Behavioral:  Negative for confusion. The patient is not nervous/anxious.        Physical Exam     Initial Vitals [11/04/23 2008]   BP Pulse Resp Temp SpO2   117/78 110 16 99.9 °F (37.7 °C) 98 %      MAP       --         Physical Exam    Nursing note and vitals reviewed.  Constitutional: She appears well-developed and well-nourished. She is not diaphoretic. No distress.   HENT:   Head: Normocephalic.   Right Ear: External ear normal.   Left Ear: External ear normal.   Nose: Nose normal.   Mild posterior oropharynx erythema with no exudates or ulcerations.   Eyes: Conjunctivae and EOM are normal. Right eye exhibits no discharge. Left eye exhibits no discharge. No scleral icterus.   Neck:   Normal range of motion.  Cardiovascular:  Normal rate, regular rhythm and normal heart sounds.     Exam reveals no gallop and no friction rub.       No murmur heard.  Pulmonary/Chest: Breath sounds normal. No stridor. No respiratory distress. She has no wheezes. She has no rhonchi. She has no rales.   Musculoskeletal:         General: Normal range of motion.      Cervical back: Normal range of motion.     Neurological: She is alert.   Skin: Skin is warm. No rash noted. No erythema.   Psychiatric: She has a normal mood and affect. Her behavior is normal.         ED Course   Procedures  Labs Reviewed   COVID/FLU A&B PCR - Abnormal; Notable for the following components:       Result Value    Influenza A PCR Detected (*)     All other components within normal limits    Narrative:     The Xpert Xpress SARS-CoV-2/FLU/RSV plus is a rapid, multiplexed real-time PCR test intended for the simultaneous qualitative detection  and differentiation of SARS-CoV-2, Influenza A, Influenza B, and respiratory syncytial virus (RSV) viral RNA in either nasopharyngeal swab or nasal swab specimens.         STREP GROUP A BY PCR - Normal    Narrative:     The Xpert Xpress Strep A test is a rapid, qualitative in vitro diagnostic test for the detection of Streptococcus pyogenes (Group A ß-hemolytic Streptococcus, Strep A) in throat swab specimens from patients with signs and symptoms of pharyngitis.     HCG QUALITATIVE URINE - Normal          Imaging Results              X-Ray Chest 1 View (In process)  Result time 11/04/23 21:16:44                  X-Rays:   Independently Interpreted Readings:   Chest X-Ray: Normal heart size.  No infiltrates.  No acute abnormalities.     Medications   ketorolac injection 30 mg (has no administration in time range)     Medical Decision Making  Differentials:  COVID, flu, viral URI, strep pharyngitis, pneumonia   Well-appearing 28-year-old female presents with stable vital signs.  Symptoms most consistent with a viral pattern.  COVID and flu B tests were negative flu a test was positive.  Chest x-ray showed no acute process per my read.  Adequate hydration was advised.  Toradol shot given per patient request.  Good hand hygiene was also discussed.  Tamiflu sent to pharmacy.  All questions were answered in layman's terms and return precautions were discussed.    Amount and/or Complexity of Data Reviewed  Labs: ordered. Decision-making details documented in ED Course.  Radiology: ordered and independent interpretation performed. Decision-making details documented in ED Course.    Risk  Prescription drug management.                               Clinical Impression:   Final diagnoses:  [J10.1] Influenza A (Primary)        ED Disposition Condition    Discharge Stable          ED Prescriptions       Medication Sig Dispense Start Date End Date Auth. Provider    oseltamivir (TAMIFLU) 75 MG capsule Take 1 capsule (75 mg total)  by mouth 2 (two) times daily. for 5 days 10 capsule 11/4/2023 11/9/2023 Sae Nelson MD          Follow-up Information       Follow up With Specialties Details Why Contact Info    [facility] - Emergency Dept Emergency Medicine  If symptoms worsen [address] [facility] [id number] [phone]    Tru Corley, DO Family Medicine Schedule an appointment as soon as possible for a visit   [address] [facility] [address]  [phone]               Sae Nelson MD  11/04/23 [id number]

## 2023-11-17 ENCOUNTER — HOSPITAL ENCOUNTER (EMERGENCY)
Facility: HOSPITAL | Age: 28
Discharge: HOME | End: 2023-11-17
Attending: FAMILY MEDICINE
Payer: MEDICAID

## 2023-11-17 VITALS
HEART RATE: 68 BPM | RESPIRATION RATE: 20 BRPM | WEIGHT: 150 LBS | BODY MASS INDEX: 30.24 KG/M2 | HEIGHT: 59 IN | OXYGEN SATURATION: 96 % | DIASTOLIC BLOOD PRESSURE: 67 MMHG | SYSTOLIC BLOOD PRESSURE: 130 MMHG | TEMPERATURE: 99 F

## 2023-11-17 DIAGNOSIS — R10.2 SUPRAPUBIC PAIN: ICD-10-CM

## 2023-11-17 DIAGNOSIS — R10.30 LOWER ABDOMINAL PAIN: Primary | ICD-10-CM

## 2023-11-17 LAB
APPEARANCE UR: ABNORMAL
B-HCG SERPL QL: NEGATIVE
BILIRUB UR QL STRIP.AUTO: NEGATIVE
COLOR UR AUTO: YELLOW
GLUCOSE UR QL STRIP.AUTO: NEGATIVE
KETONES UR QL STRIP.AUTO: NEGATIVE
LEUKOCYTE ESTERASE UR QL STRIP.AUTO: NEGATIVE
NITRITE UR QL STRIP.AUTO: NEGATIVE
PH UR STRIP.AUTO: 5.5 [PH]
PROT UR QL STRIP.AUTO: NEGATIVE
RBC UR QL AUTO: NEGATIVE
SP GR UR STRIP.AUTO: >=1.03 (ref 1–1.03)
UROBILINOGEN UR STRIP-ACNC: 0.2

## 2023-11-17 PROCEDURE — 99283 EMERGENCY DEPT VISIT LOW MDM: CPT

## 2023-11-17 PROCEDURE — 81003 URINALYSIS AUTO W/O SCOPE: CPT | Performed by: FAMILY MEDICINE

## 2023-11-17 PROCEDURE — 81025 URINE PREGNANCY TEST: CPT | Performed by: FAMILY MEDICINE

## 2023-11-17 NOTE — DISCHARGE INSTRUCTIONS
Rest  Increase fluid intake  Follow up with PCP in 3-4 days   Make take Tylenol over-the-counter as directed for pain

## 2023-11-17 NOTE — ED PROVIDER NOTES
Encounter Date: 2023       History     Chief Complaint   Patient presents with    Back Pain     Bilateral lower back and stomach pain, also reports sore breasts x 1 week.     This patient is a 28-year-old female who comes in with suprapubic pain.  Patient states it is a possibility that she is pregnant and states that her last menstrual period was 10/14/2023.  I explained to the patient that our pregnancy tests are pretty specific and will usually be positive when she past year date of when your.  Is due.  Patient asks me if I am going to do a blood test and I tell her no because this is the emergency room and we only do beta HCGs on patients who have threatened ABs and other emergencies.  Patient tells me that she believes she might have an ovarian cyst.  Patient's vital signs are completely normal and I told her she could have follow-up with her OBGYN to order an outpatient ultrasound.  Patient upset with a workup here today    The history is provided by the patient.     Review of patient's allergies indicates:   Allergen Reactions    Ampicillin Hives    Penicillin Rash and Nausea And Vomiting     Past Medical History:   Diagnosis Date    Amenorrhea, unspecified     Bacterial vaginosis     Chronic abdominal pain     Excessive daytime sleepiness     Free fluid in pelvis     Pelvic pain     Unspecified hemorrhoids     Urinary frequency      Past Surgical History:   Procedure Laterality Date     SECTION  09/15/2015     SECTION  2013    CHOLECYSTECTOMY  2011    HERNIA REPAIR  2018     Family History   Problem Relation Age of Onset    Diabetes Mellitus Mother     Hyperlipidemia Mother     Diabetes Mother     Hypertension Father     Learning disabilities Daughter     Learning disabilities Son      Social History     Tobacco Use    Smoking status: Some Days     Current packs/day: 1.00     Types: Cigarettes, Vaping with nicotine    Smokeless tobacco: Never   Substance Use Topics     Alcohol use: Not Currently    Drug use: Never     Review of Systems   Constitutional: Negative.    HENT: Negative.     Respiratory: Negative.     Cardiovascular: Negative.    Gastrointestinal:  Positive for abdominal pain.   Endocrine: Negative.    Musculoskeletal:  Positive for back pain.   Neurological: Negative.    Psychiatric/Behavioral: Negative.     All other systems reviewed and are negative.      Physical Exam     Initial Vitals [11/17/23 0955]   BP Pulse Resp Temp SpO2   130/67 75 20 98.9 °F (37.2 °C) 99 %      MAP       --         Physical Exam    Nursing note and vitals reviewed.  Constitutional: She appears well-developed.   HENT:   Head: Normocephalic.   Eyes: Pupils are equal, round, and reactive to light.   Neck:   Normal range of motion.  Cardiovascular:  Normal rate, regular rhythm and normal heart sounds.           Pulmonary/Chest: Breath sounds normal.   Abdominal: Abdomen is soft.   Mild suprapubic pain       Musculoskeletal:         General: Normal range of motion.      Cervical back: Normal range of motion.        Back:       Comments: Patient states that she has pain across her lower back.  It is not specified to 1 side or the other which would make it generalized and most likely not a kidney stone.  Patient is comfortable at this point, even when I do my exam she is not really showing symptoms of severe pain.     Neurological: She is alert and oriented to person, place, and time. GCS score is 15. GCS eye subscore is 4. GCS verbal subscore is 5. GCS motor subscore is 6.   Skin: Skin is warm and dry.   Psychiatric: She has a normal mood and affect.         ED Course   Procedures  Labs Reviewed   URINALYSIS - Abnormal; Notable for the following components:       Result Value    Appearance, UA SL CLOUDY (*)     All other components within normal limits   PREGNANCY TEST, URINE RAPID - Normal          Imaging Results              X-Ray Abdomen Flat And Erect (In process)                       Medications - No data to display  Medical Decision Making  This patient is a 28-year-old female who comes in with suprapubic pain.  She also complains of lower back pain that is generalized.  Patient believes she may be pregnant and her last menstrual period was 10/14/2023.  Patient asks me if I will do a beta hCG on her and I told her no, that if her pregnancy test is negative then her chances of being pregnant are low.  Patient upset with this answer and also states how do I know that she does not have an ovarian cyst.  I explained to the patient that her vital signs are stable at the moment and would not indicate a hemorrhagic ovarian cyst.  Also, the pain level on exam was very minimal which would also not indicate a hemorrhagic ovarian cyst.  A normal ovarian cyst can be followed up with an OBGYN and she can have an outpatient ultrasound.  Patient not happy with this answer.  I also told her I felt that there was constipation on her x-ray and she said that there was no way that she could be constipated because she took something to clean herself out 2 days ago.  Differential diagnosis:  Urinary tract infection, pregnancy, constipation  Patient then told me that she will go to another hospital because something is wrong with her and I told her was free to go wherever she wanted.    Amount and/or Complexity of Data Reviewed  Labs: ordered.     Details: Patient's urine is cloudy but otherwise normal.  Patient's urine pregnancy test is negative  Radiology: ordered.     Details: Flat and erect of abdomen shows mild constipation, scattered stool throughout the colon but normal bowel gas                                   Clinical Impression:  Final diagnoses:  [R10.2] Suprapubic pain  [R10.30] Lower abdominal pain (Primary)          ED Disposition Condition    Discharge Stable          ED Prescriptions    None       Follow-up Information       Follow up With Specialties Details Why Contact Info    Tru Corley  DO Family Medicine Schedule an appointment as soon as possible for a visit   [address] [facility] [address]  [phone]               June Ngo MD  11/17/23 7328

## 2023-11-17 NOTE — ED NOTES
Pt ambulated to ed rm 3 from [facility]. Aaox4. Pt reports bilateral lower back pain with lower abd pain for a week. Nausea. Breast tenderness. Denies v/d/fever. Denies urinary symptoms. Lmp 10/12. In no distress. Wctm

## 2024-02-05 ENCOUNTER — PATIENT MESSAGE (OUTPATIENT)
Dept: ADMINISTRATIVE | Facility: HOSPITAL | Age: 29
End: 2024-02-05
Payer: MEDICAID

## 2024-03-07 DIAGNOSIS — Z13.6 ENCOUNTER FOR LIPID SCREENING FOR CARDIOVASCULAR DISEASE: ICD-10-CM

## 2024-03-07 DIAGNOSIS — Z13.220 ENCOUNTER FOR LIPID SCREENING FOR CARDIOVASCULAR DISEASE: ICD-10-CM

## 2024-03-07 DIAGNOSIS — Z11.4 ENCOUNTER FOR SCREENING FOR HIV: ICD-10-CM

## 2024-03-07 DIAGNOSIS — Z00.00 WELLNESS EXAMINATION: Primary | ICD-10-CM

## 2024-03-07 DIAGNOSIS — Z11.59 NEED FOR HEPATITIS C SCREENING TEST: ICD-10-CM

## 2024-03-11 ENCOUNTER — LAB VISIT (OUTPATIENT)
Dept: LAB | Facility: HOSPITAL | Age: 29
End: 2024-03-11
Payer: MEDICAID

## 2024-03-11 DIAGNOSIS — Z13.220 ENCOUNTER FOR LIPID SCREENING FOR CARDIOVASCULAR DISEASE: ICD-10-CM

## 2024-03-11 DIAGNOSIS — Z00.00 WELLNESS EXAMINATION: ICD-10-CM

## 2024-03-11 DIAGNOSIS — L68.0 HIRSUTISM: ICD-10-CM

## 2024-03-11 DIAGNOSIS — Z13.6 ENCOUNTER FOR LIPID SCREENING FOR CARDIOVASCULAR DISEASE: ICD-10-CM

## 2024-03-11 DIAGNOSIS — Z11.4 ENCOUNTER FOR SCREENING FOR HIV: ICD-10-CM

## 2024-03-11 DIAGNOSIS — Z11.59 NEED FOR HEPATITIS C SCREENING TEST: ICD-10-CM

## 2024-03-11 LAB
ALBUMIN SERPL-MCNC: 3.7 G/DL (ref 3.5–5)
ALBUMIN/GLOB SERPL: 1.1 RATIO (ref 1.1–2)
ALP SERPL-CCNC: 74 UNIT/L (ref 40–150)
ALT SERPL-CCNC: 13 UNIT/L (ref 0–55)
AST SERPL-CCNC: 12 UNIT/L (ref 5–34)
BASOPHILS # BLD AUTO: 0.03 X10(3)/MCL
BASOPHILS NFR BLD AUTO: 0.5 %
BILIRUB SERPL-MCNC: 0.5 MG/DL
BUN SERPL-MCNC: 11 MG/DL (ref 7–18.7)
CALCIUM SERPL-MCNC: 9.4 MG/DL (ref 8.4–10.2)
CHLORIDE SERPL-SCNC: 110 MMOL/L (ref 98–107)
CHOLEST SERPL-MCNC: 203 MG/DL
CHOLEST/HDLC SERPL: 6 {RATIO} (ref 0–5)
CO2 SERPL-SCNC: 21 MMOL/L (ref 22–29)
CREAT SERPL-MCNC: 0.8 MG/DL (ref 0.55–1.02)
EOSINOPHIL # BLD AUTO: 0.08 X10(3)/MCL (ref 0–0.9)
EOSINOPHIL NFR BLD AUTO: 1.3 %
ERYTHROCYTE [DISTWIDTH] IN BLOOD BY AUTOMATED COUNT: 13 % (ref 11.5–17)
GFR SERPLBLD CREATININE-BSD FMLA CKD-EPI: >60 MLS/MIN/1.73/M2
GLOBULIN SER-MCNC: 3.5 GM/DL (ref 2.4–3.5)
GLUCOSE SERPL-MCNC: 111 MG/DL (ref 74–100)
HCT VFR BLD AUTO: 41.5 % (ref 37–47)
HCV AB SERPL QL IA: NONREACTIVE
HDLC SERPL-MCNC: 34 MG/DL (ref 35–60)
HGB BLD-MCNC: 14 G/DL (ref 12–16)
HIV 1+2 AB+HIV1 P24 AG SERPL QL IA: NONREACTIVE
IMM GRANULOCYTES # BLD AUTO: 0.01 X10(3)/MCL (ref 0–0.04)
IMM GRANULOCYTES NFR BLD AUTO: 0.2 %
LDLC SERPL CALC-MCNC: 138 MG/DL (ref 50–140)
LYMPHOCYTES # BLD AUTO: 1.82 X10(3)/MCL (ref 0.6–4.6)
LYMPHOCYTES NFR BLD AUTO: 30.5 %
MCH RBC QN AUTO: 29.2 PG (ref 27–31)
MCHC RBC AUTO-ENTMCNC: 33.7 G/DL (ref 33–36)
MCV RBC AUTO: 86.5 FL (ref 80–94)
MONOCYTES # BLD AUTO: 0.42 X10(3)/MCL (ref 0.1–1.3)
MONOCYTES NFR BLD AUTO: 7 %
NEUTROPHILS # BLD AUTO: 3.61 X10(3)/MCL (ref 2.1–9.2)
NEUTROPHILS NFR BLD AUTO: 60.5 %
NRBC BLD AUTO-RTO: 0 %
PLATELET # BLD AUTO: 283 X10(3)/MCL (ref 130–400)
PMV BLD AUTO: 10.7 FL (ref 7.4–10.4)
POTASSIUM SERPL-SCNC: 3.7 MMOL/L (ref 3.5–5.1)
PROT SERPL-MCNC: 7.2 GM/DL (ref 6.4–8.3)
RBC # BLD AUTO: 4.8 X10(6)/MCL (ref 4.2–5.4)
SODIUM SERPL-SCNC: 140 MMOL/L (ref 136–145)
TRIGL SERPL-MCNC: 155 MG/DL (ref 37–140)
VLDLC SERPL CALC-MCNC: 31 MG/DL
WBC # SPEC AUTO: 5.97 X10(3)/MCL (ref 4.5–11.5)

## 2024-03-11 PROCEDURE — 80053 COMPREHEN METABOLIC PANEL: CPT

## 2024-03-11 PROCEDURE — 87389 HIV-1 AG W/HIV-1&-2 AB AG IA: CPT

## 2024-03-11 PROCEDURE — 80061 LIPID PANEL: CPT

## 2024-03-11 PROCEDURE — 84439 ASSAY OF FREE THYROXINE: CPT

## 2024-03-11 PROCEDURE — 85025 COMPLETE CBC W/AUTO DIFF WBC: CPT

## 2024-03-11 PROCEDURE — 84481 FREE ASSAY (FT-3): CPT

## 2024-03-11 PROCEDURE — 84443 ASSAY THYROID STIM HORMONE: CPT

## 2024-03-11 PROCEDURE — 36415 COLL VENOUS BLD VENIPUNCTURE: CPT

## 2024-03-11 PROCEDURE — 86803 HEPATITIS C AB TEST: CPT

## 2024-03-12 ENCOUNTER — TELEPHONE (OUTPATIENT)
Dept: FAMILY MEDICINE | Facility: CLINIC | Age: 29
End: 2024-03-12

## 2024-03-12 ENCOUNTER — OFFICE VISIT (OUTPATIENT)
Dept: FAMILY MEDICINE | Facility: CLINIC | Age: 29
End: 2024-03-12
Payer: MEDICAID

## 2024-03-12 VITALS
OXYGEN SATURATION: 98 % | HEIGHT: 59 IN | WEIGHT: 144.63 LBS | HEART RATE: 70 BPM | BODY MASS INDEX: 29.16 KG/M2 | DIASTOLIC BLOOD PRESSURE: 71 MMHG | SYSTOLIC BLOOD PRESSURE: 107 MMHG | TEMPERATURE: 98 F | RESPIRATION RATE: 20 BRPM

## 2024-03-12 DIAGNOSIS — L68.0 HIRSUTISM: ICD-10-CM

## 2024-03-12 DIAGNOSIS — Z00.00 WELLNESS EXAMINATION: Primary | ICD-10-CM

## 2024-03-12 DIAGNOSIS — E78.2 MIXED HYPERLIPIDEMIA: ICD-10-CM

## 2024-03-12 LAB
T3FREE SERPL-MCNC: 3.26 PG/ML (ref 1.58–3.91)
T4 FREE SERPL-MCNC: 0.95 NG/DL (ref 0.7–1.48)
TSH SERPL-ACNC: 1.87 UIU/ML (ref 0.35–4.94)

## 2024-03-12 PROCEDURE — 1159F MED LIST DOCD IN RCRD: CPT | Mod: CPTII,,,

## 2024-03-12 PROCEDURE — 3008F BODY MASS INDEX DOCD: CPT | Mod: CPTII,,,

## 2024-03-12 PROCEDURE — 3078F DIAST BP <80 MM HG: CPT | Mod: CPTII,,,

## 2024-03-12 PROCEDURE — 1160F RVW MEDS BY RX/DR IN RCRD: CPT | Mod: CPTII,,,

## 2024-03-12 PROCEDURE — 99395 PREV VISIT EST AGE 18-39: CPT | Mod: ,,,

## 2024-03-12 PROCEDURE — 3074F SYST BP LT 130 MM HG: CPT | Mod: CPTII,,,

## 2024-03-12 NOTE — PROGRESS NOTES
Patient ID: [id number]     Chief Complaint: Annual Exam and Thyroid Problem (Difficulty swallowing and facial hair)      HPI:     Adrienne Caba is a 28 y.o. female here today for an annual wellness visit. Reviewed and discussed lab results. She reports that she is having difficulty swallowing, weight fluctuations, and facial hair for a few months.     Past Medical History:   Diagnosis Date    Amenorrhea, unspecified     Bacterial vaginosis     Chronic abdominal pain     Excessive daytime sleepiness     Free fluid in pelvis     Pelvic pain     Unspecified hemorrhoids     Urinary frequency         Past Surgical History:   Procedure Laterality Date     SECTION  09/15/2015     SECTION  2013    CHOLECYSTECTOMY  2011    HERNIA REPAIR  2018        Social History     Socioeconomic History    Marital status: Single   Tobacco Use    Smoking status: Some Days     Current packs/day: 0.00     Average packs/day: 1 pack/day for 14.0 years (14.0 ttl pk-yrs)     Types: Cigarettes, Vaping with nicotine     Start date: 3/12/2009     Last attempt to quit: 3/12/2023     Years since quittin.0    Smokeless tobacco: Never   Substance and Sexual Activity    Alcohol use: Not Currently    Drug use: Never    Sexual activity: Yes     Partners: Male     Birth control/protection: None     Social Determinants of Health     Financial Resource Strain: Low Risk  (2023)    Overall Financial Resource Strain (CARDIA)     Difficulty of Paying Living Expenses: Not hard at all   Food Insecurity: No Food Insecurity (2023)    Hunger Vital Sign     Worried About Running Out of Food in the Last Year: Never true     Ran Out of Food in the Last Year: Never true   Transportation Needs: No Transportation Needs (2023)    PRAPARE - Transportation     Lack of Transportation (Medical): No     Lack of Transportation (Non-Medical): No   Physical Activity: Sufficiently Active (2023)    Exercise Vital Sign     Days  "of Exercise per Week: 7 days     Minutes of Exercise per Session: 30 min   Stress: Stress Concern Present (5/8/2023)    [employer] - Occupational Stress Questionnaire     Feeling of Stress : To some extent   Social Connections: Moderately Integrated (5/8/2023)    Social Connection and Isolation Panel [NHANES]     Frequency of Communication with Friends and Family: More than three times a week     Frequency of Social Gatherings with Friends and Family: More than three times a week     Attends [employer] Services: 1 to 4 times per year     Active Member of Clubs or Organizations: Yes     Attends Club or Organization Meetings: Never     Marital Status: Never    Housing Stability: Low Risk  (5/8/2023)    Housing Stability Vital Sign     Unable to Pay for Housing in the Last Year: No     Number of Places Lived in the Last Year: 1     Unstable Housing in the Last Year: No        No current outpatient medications    Review of patient's allergies indicates:   Allergen Reactions    Ampicillin Hives    Penicillin Rash and Nausea And Vomiting        Patient Care Team:  Tru Corley DO as PCP - General (Family Medicine)  Mike Joseph MD (Obstetrics and Gynecology)  Jimenez August MD as Consulting Physician (Obstetrics and Gynecology)     Subjective:     Review of Systems    12 point review of systems conducted, negative except as stated in the history of present illness. See HPI for details.    Objective:     Visit Vitals  /71 (BP Location: Right arm, Patient Position: Sitting, BP Method: Large (Automatic))   Pulse 70   Temp 98.2 °F (36.8 °C)   Resp 20   Ht 4' 11" (1.499 m)   Wt 65.6 kg (144 lb 9.6 oz)   LMP 03/10/2024   SpO2 98%   BMI 29.21 kg/m²       Physical Exam  Vitals and nursing note reviewed.   Constitutional:       General: She is not in acute distress.     Appearance: She is not ill-appearing.   HENT:      Head: Normocephalic and atraumatic.      Mouth/Throat: "      Mouth: Mucous membranes are moist.      Pharynx: Oropharynx is clear.   Eyes:      General: No scleral icterus.     Extraocular Movements: Extraocular movements intact.      Conjunctiva/sclera: Conjunctivae normal.      Pupils: Pupils are equal, round, and reactive to light.   Neck:      Vascular: No carotid bruit.   Cardiovascular:      Rate and Rhythm: Normal rate and regular rhythm.      Heart sounds: No murmur heard.     No friction rub. No gallop.   Pulmonary:      Effort: Pulmonary effort is normal. No respiratory distress.      Breath sounds: Normal breath sounds. No wheezing, rhonchi or rales.   Abdominal:      General: Abdomen is flat. Bowel sounds are normal. There is no distension.      Palpations: Abdomen is soft. There is no mass.      Tenderness: There is no abdominal tenderness.   Musculoskeletal:         General: Normal range of motion.      Cervical back: Normal range of motion and neck supple.   Skin:     General: Skin is warm and dry.   Neurological:      General: No focal deficit present.      Mental Status: She is alert.   Psychiatric:         Mood and Affect: Mood normal.         Labs Reviewed:     Chemistry:  Lab Results   Component Value Date     03/11/2024    K 3.7 03/11/2024    CHLORIDE 110 (H) 03/11/2024    BUN 11.0 03/11/2024    CREATININE 0.80 03/11/2024    EGFRNORACEVR >60 03/11/2024    GLUCOSE 111 (H) 03/11/2024    CALCIUM 9.4 03/11/2024    ALKPHOS 74 03/11/2024    LABPROT 7.2 03/11/2024    ALBUMIN 3.7 03/11/2024    AST 12 03/11/2024    ALT 13 03/11/2024      Hematology:  Lab Results   Component Value Date    WBC 5.97 03/11/2024    HGB 14.0 03/11/2024    HCT 41.5 03/11/2024     03/11/2024       Lipid Panel:  Lab Results   Component Value Date    CHOL 203 (H) 03/11/2024    HDL 34 (L) 03/11/2024    .00 03/11/2024    TRIG 155 (H) 03/11/2024    TOTALCHOLEST 6 (H) 03/11/2024        Urine:  Lab Results   Component Value Date    COLORUA Yellow 11/17/2023     APPEARANCEUA SL CLOUDY (A) 11/17/2023    SGUA >=1.030 11/17/2023    PHUA 5.5 11/17/2023    PROTEINUA Negative 11/17/2023    GLUCOSEUA Negative 11/17/2023    KETONESUA Negative 11/17/2023    BLOODUA Negative 11/17/2023    NITRITESUA Negative 11/17/2023    LEUKOCYTESUR Negative 11/17/2023    RBCUA 0-2 11/07/2022    WBCUA None Seen 11/07/2022    BACTERIA Rare 11/07/2022        Assessment:       ICD-10-CM ICD-9-CM   1. Wellness examination  Z00.00 V70.0   2. Mixed hyperlipidemia  E78.2 272.2   3. Hirsutism  L68.0 704.1        Plan:     Cervical Cancer Screening -  Last Pap on 7/19/23. Normal. Repeat in 3 years.     Breast Cancer Screening - Recommended at age 40.     Colon Cancer Screening - Recommended at age 45.     Osteoporosis Screening - Recommended at age 65.     Eye Exam - Recommend annually.    Dental Exam - Recommend biannual exams.     Vaccinations -   Immunization History   Administered Date(s) Administered    DTP 1995, 1995, 1995, 02/25/1997    DTaP 02/09/2000    HIB 1995, 1995, 09/16/1996, 02/25/1997    HPV 9-Valent 09/18/2009, 03/17/2010, 07/12/2010    HPV Quadrivalent 09/18/2009, 03/17/2010, 07/12/2010    Hepatitis A, Pediatric/Adolescent, 2 Dose 07/10/2006, 10/25/2010    Hepatitis B 1995, 1995, 1995    Hepatitis B, Adult 1995, 1995, 1995    HiB PRP-T 1995, 1995, 09/15/1996, 02/25/1997    Influenza - Trivalent - PF (ADULT) 10/31/2019    Influenza A (H1N1) 2009 Monovalent - IM 11/30/2009    MMR 09/16/1996, 02/09/2000    Meningococcal Conjugate (MCV4P) 11/30/2009    OPV 1995, 1995, 1995, 09/16/1996    Pneumococcal Conjugate - 13 Valent 01/22/2002    Pneumococcal Conjugate - 7 Valent 01/22/2002    Tdap 07/10/2006, 10/31/2019          1. Wellness examination    2. Mixed hyperlipidemia  Assessment & Plan:  Lab Results   Component Value Date    .00 03/11/2024    TRIG 155 (H) 03/11/2024    HDL 34 (L) 03/11/2024     TOTALCHOLEST 6 (H) 03/11/2024       Stressed importance of dietary modifications. Follow a low cholesterol, low saturated fat diet with less that 200mg of cholesterol a day.  Avoid fried foods and high saturated fats (high saturated fats less than 7% of calories).  Add Flax Seed/Fish Oil supplements to diet. Increase dietary fiber.  Regular exercise can reduce LDL and raise HDL. Stressed importance of physical activity 5 times per week for 30 minutes per day.       3. Hirsutism  -     TSH; Future; Expected date: 03/12/2024  -     T4, Free; Future; Expected date: 03/12/2024  -     T3, Free (OLG); Future; Expected date: 03/12/2024      Follow up in about 1 year (around 3/12/2025) for Wellness. In addition to their scheduled follow up, the patient has also been instructed to follow up on as needed basis.       Terrell Prasad NP

## 2024-03-12 NOTE — ASSESSMENT & PLAN NOTE
Lab Results   Component Value Date    .00 03/11/2024    TRIG 155 (H) 03/11/2024    HDL 34 (L) 03/11/2024    TOTALCHOLEST 6 (H) 03/11/2024       Stressed importance of dietary modifications. Follow a low cholesterol, low saturated fat diet with less that 200mg of cholesterol a day.  Avoid fried foods and high saturated fats (high saturated fats less than 7% of calories).  Add Flax Seed/Fish Oil supplements to diet. Increase dietary fiber.  Regular exercise can reduce LDL and raise HDL. Stressed importance of physical activity 5 times per week for 30 minutes per day.

## 2024-04-16 ENCOUNTER — DOCUMENTATION ONLY (OUTPATIENT)
Dept: FAMILY MEDICINE | Facility: CLINIC | Age: 29
End: 2024-04-16
Payer: MEDICAID

## 2024-04-16 LAB
HUMAN PAPILLOMAVIRUS (HPV): NORMAL
PAP RECOMMENDATION EXT: NORMAL
PAP SMEAR: NORMAL

## 2024-05-22 ENCOUNTER — LAB VISIT (OUTPATIENT)
Dept: LAB | Facility: HOSPITAL | Age: 29
End: 2024-05-22
Payer: MEDICAID

## 2024-05-22 ENCOUNTER — OFFICE VISIT (OUTPATIENT)
Dept: FAMILY MEDICINE | Facility: CLINIC | Age: 29
End: 2024-05-22
Payer: MEDICAID

## 2024-05-22 VITALS
WEIGHT: 128.63 LBS | DIASTOLIC BLOOD PRESSURE: 73 MMHG | HEIGHT: 59 IN | HEART RATE: 83 BPM | TEMPERATURE: 98 F | OXYGEN SATURATION: 97 % | RESPIRATION RATE: 18 BRPM | BODY MASS INDEX: 25.93 KG/M2 | SYSTOLIC BLOOD PRESSURE: 113 MMHG

## 2024-05-22 DIAGNOSIS — Z11.3 SCREENING EXAMINATION FOR STD (SEXUALLY TRANSMITTED DISEASE): Primary | ICD-10-CM

## 2024-05-22 DIAGNOSIS — Z91.89 HAS MULTIPLE SEXUAL PARTNERS: ICD-10-CM

## 2024-05-22 DIAGNOSIS — N30.01 ACUTE CYSTITIS WITH HEMATURIA: ICD-10-CM

## 2024-05-22 DIAGNOSIS — Z11.3 SCREENING EXAMINATION FOR STD (SEXUALLY TRANSMITTED DISEASE): ICD-10-CM

## 2024-05-22 LAB
BACTERIA #/AREA URNS AUTO: ABNORMAL /HPF
BILIRUB UR QL STRIP.AUTO: ABNORMAL
C TRACH DNA SPEC QL NAA+PROBE: NOT DETECTED
CLARITY UR: ABNORMAL
COLOR UR AUTO: ABNORMAL
GLUCOSE UR QL STRIP: NEGATIVE
HCV AB SERPL QL IA: NONREACTIVE
HGB UR QL STRIP: ABNORMAL
HIV 1+2 AB+HIV1 P24 AG SERPL QL IA: NONREACTIVE
KETONES UR QL STRIP: ABNORMAL
LEUKOCYTE ESTERASE UR QL STRIP: NEGATIVE
MUCOUS THREADS URNS QL MICRO: ABNORMAL /LPF
N GONORRHOEA DNA SPEC QL NAA+PROBE: NOT DETECTED
NITRITE UR QL STRIP: POSITIVE
PH UR STRIP: 6 [PH]
PROT UR QL STRIP: ABNORMAL
RBC #/AREA URNS AUTO: ABNORMAL /HPF
SOURCE (OHS): NORMAL
SP GR UR STRIP.AUTO: >=1.03 (ref 1–1.03)
SQUAMOUS #/AREA URNS AUTO: ABNORMAL /HPF
T PALLIDUM AB SER QL: NONREACTIVE
UROBILINOGEN UR STRIP-ACNC: 1
WBC #/AREA URNS AUTO: ABNORMAL /HPF

## 2024-05-22 PROCEDURE — 36415 COLL VENOUS BLD VENIPUNCTURE: CPT

## 2024-05-22 PROCEDURE — 87661 TRICHOMONAS VAGINALIS AMPLIF: CPT

## 2024-05-22 PROCEDURE — 87529 HSV DNA AMP PROBE: CPT

## 2024-05-22 PROCEDURE — 87086 URINE CULTURE/COLONY COUNT: CPT

## 2024-05-22 PROCEDURE — 87591 N.GONORRHOEAE DNA AMP PROB: CPT

## 2024-05-22 PROCEDURE — 87389 HIV-1 AG W/HIV-1&-2 AB AG IA: CPT

## 2024-05-22 PROCEDURE — 1159F MED LIST DOCD IN RCRD: CPT | Mod: CPTII,,,

## 2024-05-22 PROCEDURE — 99214 OFFICE O/P EST MOD 30 MIN: CPT | Mod: ,,,

## 2024-05-22 PROCEDURE — 86696 HERPES SIMPLEX TYPE 2 TEST: CPT

## 2024-05-22 PROCEDURE — 81003 URINALYSIS AUTO W/O SCOPE: CPT

## 2024-05-22 PROCEDURE — 81001 URINALYSIS AUTO W/SCOPE: CPT

## 2024-05-22 PROCEDURE — 3008F BODY MASS INDEX DOCD: CPT | Mod: CPTII,,,

## 2024-05-22 PROCEDURE — 86780 TREPONEMA PALLIDUM: CPT

## 2024-05-22 PROCEDURE — 87491 CHLMYD TRACH DNA AMP PROBE: CPT

## 2024-05-22 PROCEDURE — 3078F DIAST BP <80 MM HG: CPT | Mod: CPTII,,,

## 2024-05-22 PROCEDURE — 1160F RVW MEDS BY RX/DR IN RCRD: CPT | Mod: CPTII,,,

## 2024-05-22 PROCEDURE — 86803 HEPATITIS C AB TEST: CPT

## 2024-05-22 PROCEDURE — 3074F SYST BP LT 130 MM HG: CPT | Mod: CPTII,,,

## 2024-05-22 NOTE — PROGRESS NOTES
Patient ID: [id number]     Chief Complaint: Exposure to STD (Unsure if she was exposed but wants all std testing possible. )      HPI:     Adrienne Caba, a 29-year-old woman, is present today for a specific concern. She has come to request STD testing as she has had two sexual partners in recent months and is uncertain if she has been exposed to an STD. She has no complaints of vaginal irritation or abnormal bleeding.    Past Medical History:   Diagnosis Date    Amenorrhea, unspecified     Bacterial vaginosis     Chronic abdominal pain     Excessive daytime sleepiness     Free fluid in pelvis     Pelvic pain     Unspecified hemorrhoids     Urinary frequency         Past Surgical History:   Procedure Laterality Date     SECTION  09/15/2015     SECTION  2013    CHOLECYSTECTOMY  2011    HERNIA REPAIR  2018        Social History     Socioeconomic History    Marital status: Single   Tobacco Use    Smoking status: Some Days     Current packs/day: 0.00     Average packs/day: 1 pack/day for 14.0 years (14.0 ttl pk-yrs)     Types: Cigarettes, Vaping with nicotine     Start date: 3/12/2009     Last attempt to quit: 3/12/2023     Years since quittin.2    Smokeless tobacco: Never   Substance and Sexual Activity    Alcohol use: Not Currently    Drug use: Never    Sexual activity: Yes     Partners: Male     Birth control/protection: None     Social Determinants of Health     Financial Resource Strain: Low Risk  (2023)    Overall Financial Resource Strain (CARDIA)     Difficulty of Paying Living Expenses: Not hard at all   Food Insecurity: No Food Insecurity (2023)    Hunger Vital Sign     Worried About Running Out of Food in the Last Year: Never true     Ran Out of Food in the Last Year: Never true   Transportation Needs: No Transportation Needs (2023)    PRAPARE - Transportation     Lack of Transportation (Medical): No     Lack of Transportation (Non-Medical): No   Physical  "Activity: Sufficiently Active (5/8/2023)    Exercise Vital Sign     Days of Exercise per Week: 7 days     Minutes of Exercise per Session: 30 min   Stress: Stress Concern Present (5/8/2023)    [employer] - Occupational Stress Questionnaire     Feeling of Stress : To some extent   Housing Stability: Low Risk  (5/8/2023)    Housing Stability Vital Sign     Unable to Pay for Housing in the Last Year: No     Number of Places Lived in the Last Year: 1     Unstable Housing in the Last Year: No        Current Outpatient Medications   Medication Instructions    nitrofurantoin, macrocrystal-monohydrate, (MACROBID) 100 MG capsule 100 mg, Oral, 2 times daily       Review of patient's allergies indicates:   Allergen Reactions    Ampicillin Hives    Penicillin Rash and Nausea And Vomiting        Patient Care Team:  Tru Corley DO as PCP - General (Family Medicine)  Mike Joseph MD (Obstetrics and Gynecology)  Jimenez August MD as Consulting Physician (Obstetrics and Gynecology)     Subjective:     Review of Systems    12 point review of systems conducted, negative except as stated in the history of present illness. See HPI for details.    Objective:     Visit Vitals  /73   Pulse 83   Temp 98.1 °F (36.7 °C)   Resp 18   Ht 4' 11" (1.499 m)   Wt 58.3 kg (128 lb 9.6 oz)   LMP 05/17/2024 (Within Days)   SpO2 97%   BMI 25.97 kg/m²       Physical Exam  Vitals and nursing note reviewed.   Constitutional:       General: She is not in acute distress.     Appearance: She is not ill-appearing.   HENT:      Head: Normocephalic and atraumatic.      Mouth/Throat:      Mouth: Mucous membranes are moist.      Pharynx: Oropharynx is clear.   Eyes:      General: No scleral icterus.     Extraocular Movements: Extraocular movements intact.      Conjunctiva/sclera: Conjunctivae normal.      Pupils: Pupils are equal, round, and reactive to light.   Neck:      Vascular: No carotid bruit. "   Cardiovascular:      Rate and Rhythm: Normal rate and regular rhythm.      Heart sounds: No murmur heard.     No friction rub. No gallop.   Pulmonary:      Effort: Pulmonary effort is normal. No respiratory distress.      Breath sounds: Normal breath sounds. No wheezing, rhonchi or rales.   Abdominal:      General: Abdomen is flat. Bowel sounds are normal. There is no distension.      Palpations: Abdomen is soft. There is no mass.      Tenderness: There is no abdominal tenderness.   Musculoskeletal:         General: Normal range of motion.      Cervical back: Normal range of motion and neck supple.   Skin:     General: Skin is warm and dry.   Neurological:      General: No focal deficit present.      Mental Status: She is alert.   Psychiatric:         Mood and Affect: Mood normal.       Labs Reviewed:     Chemistry:  Lab Results   Component Value Date     03/11/2024    K 3.7 03/11/2024    CHLORIDE 110 (H) 03/11/2024    BUN 11.0 03/11/2024    CREATININE 0.80 03/11/2024    EGFRNORACEVR >60 03/11/2024    GLUCOSE 111 (H) 03/11/2024    CALCIUM 9.4 03/11/2024    ALKPHOS 74 03/11/2024    LABPROT 7.2 03/11/2024    ALBUMIN 3.7 03/11/2024    AST 12 03/11/2024    ALT 13 03/11/2024    TSH 1.866 03/11/2024    [id number] 0.95 03/11/2024      Hematology:  Lab Results   Component Value Date    WBC 5.97 03/11/2024    HGB 14.0 03/11/2024    HCT 41.5 03/11/2024     03/11/2024       Lipid Panel:  Lab Results   Component Value Date    CHOL 203 (H) 03/11/2024    HDL 34 (L) 03/11/2024    .00 03/11/2024    TRIG 155 (H) 03/11/2024    TOTALCHOLEST 6 (H) 03/11/2024        Urine:  Lab Results   Component Value Date    COLORUA Brown (A) 05/22/2024    APPEARANCEUA Cloudy (A) 05/22/2024    SGUA >=1.030 05/22/2024    PHUA 6.0 05/22/2024    PROTEINUA 2+ (A) 05/22/2024    GLUCOSEUA Negative 05/22/2024    KETONESUA 3+ (A) 05/22/2024    BLOODUA 3+ (A) 05/22/2024    NITRITESUA Positive (A) 05/22/2024    LEUKOCYTESUR Negative  05/22/2024    RBCUA 51-99 (A) 05/22/2024    WBCUA 3-5 05/22/2024    BACTERIA Moderate (A) 05/22/2024        Assessment:       ICD-10-CM ICD-9-CM   1. Screening examination for STD (sexually transmitted disease)  Z11.3 V74.5   2. Has multiple sexual partners  Z91.89 V15.89   3. Acute cystitis with hematuria  N30.01 595.0        Plan:     1. Screening examination for STD (sexually transmitted disease)  -     Herpes Simplex Virus, PCR, Blood; Future; Expected date: 05/22/2024  -     HIV 1/2 Ag/Ab (4th Gen); Future; Expected date: 05/22/2024  -     Hepatitis C Antibody; Future; Expected date: 05/22/2024  -     Chlamydia/GC, PCR; Future; Expected date: 05/22/2024  -     Trichomonas Prep Wet Mount; Future; Expected date: 05/22/2024  -     SYPHILIS ANTIBODY (WITH REFLEX RPR); Future; Expected date: 05/22/2024  -     HSV 1 & 2, IgG; Future; Expected date: 05/22/2024  -     Urinalysis, Reflex to Urine Culture; Future; Expected date: 05/22/2024    2. Has multiple sexual partners  -     Herpes Simplex Virus, PCR, Blood; Future; Expected date: 05/22/2024  -     HIV 1/2 Ag/Ab (4th Gen); Future; Expected date: 05/22/2024  -     Hepatitis C Antibody; Future; Expected date: 05/22/2024  -     Chlamydia/GC, PCR; Future; Expected date: 05/22/2024  -     Trichomonas Prep Wet Mount; Future; Expected date: 05/22/2024  -     SYPHILIS ANTIBODY (WITH REFLEX RPR); Future; Expected date: 05/22/2024  -     HSV 1 & 2, IgG; Future; Expected date: 05/22/2024  -     Urinalysis, Reflex to Urine Culture; Future; Expected date: 05/22/2024    3. Acute cystitis with hematuria  -     Urine culture; Future; Expected date: 05/22/2024      Follow up if symptoms worsen or fail to improve. In addition to their scheduled follow up, the patient has also been instructed to follow up on as needed basis.     Future Appointments   Date Time Provider Department Center   3/13/2025 10:00 AM Terrell Prasad NP [facility] Ka[facility]yn Franklin, NP

## 2024-05-23 ENCOUNTER — TELEPHONE (OUTPATIENT)
Dept: FAMILY MEDICINE | Facility: CLINIC | Age: 29
End: 2024-05-23
Payer: MEDICAID

## 2024-05-23 DIAGNOSIS — N30.01 ACUTE CYSTITIS WITH HEMATURIA: Primary | ICD-10-CM

## 2024-05-23 LAB
HSV1 IGG SERPL QL IA: POSITIVE
HSV2 IGG SERPL QL IA: NEGATIVE
MAYO GENERIC ORDERABLE RESULT: NORMAL

## 2024-05-23 RX ORDER — NITROFURANTOIN 25; 75 MG/1; MG/1
100 CAPSULE ORAL 2 TIMES DAILY
Qty: 14 CAPSULE | Refills: 0 | Status: SHIPPED | OUTPATIENT
Start: 2024-05-23 | End: 2024-05-30

## 2024-05-25 LAB
BACTERIA UR CULT: ABNORMAL
HSV-1 DNA BY PCR: NEGATIVE
HSV-2 DNA BY PCR: NEGATIVE

## 2024-05-28 ENCOUNTER — TELEPHONE (OUTPATIENT)
Dept: FAMILY MEDICINE | Facility: CLINIC | Age: 29
End: 2024-05-28
Payer: MEDICAID

## 2024-05-28 RX ORDER — VANCOMYCIN HYDROCHLORIDE 125 MG/1
125 CAPSULE ORAL EVERY 6 HOURS
Qty: 40 CAPSULE | Refills: 0 | Status: CANCELLED | OUTPATIENT
Start: 2024-05-28 | End: 2024-06-07

## 2024-05-28 NOTE — TELEPHONE ENCOUNTER
Discussed UTI results and STD testing with the patient. She reports that symptoms are improving since her last visit. Continue current regimen.

## 2024-05-30 ENCOUNTER — TELEPHONE (OUTPATIENT)
Dept: FAMILY MEDICINE | Facility: CLINIC | Age: 29
End: 2024-05-30
Payer: MEDICAID

## 2024-05-30 NOTE — TELEPHONE ENCOUNTER
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 Also wanting to know if her kids can catch it.    I told her she does not need medication as she has the antibodies not a viral breakout at the moment. As how and when she caught this I told I would have no idea. Informed her that kids can catch this if they drink, eat or kiss someone who has a break out. I told her I would recommend them not drink after you if this is a concern you have.

## 2024-08-16 ENCOUNTER — OFFICE VISIT (OUTPATIENT)
Dept: FAMILY MEDICINE | Facility: CLINIC | Age: 29
End: 2024-08-16
Payer: MEDICAID

## 2024-08-16 ENCOUNTER — LAB VISIT (OUTPATIENT)
Dept: LAB | Facility: HOSPITAL | Age: 29
End: 2024-08-16
Payer: MEDICAID

## 2024-08-16 VITALS
SYSTOLIC BLOOD PRESSURE: 99 MMHG | RESPIRATION RATE: 20 BRPM | TEMPERATURE: 98 F | WEIGHT: 131.19 LBS | HEIGHT: 59 IN | OXYGEN SATURATION: 98 % | HEART RATE: 62 BPM | BODY MASS INDEX: 26.45 KG/M2 | DIASTOLIC BLOOD PRESSURE: 65 MMHG

## 2024-08-16 DIAGNOSIS — R10.32 PAIN, ABDOMINAL, LLQ: Primary | ICD-10-CM

## 2024-08-16 DIAGNOSIS — Z91.89 HAS MULTIPLE SEXUAL PARTNERS: ICD-10-CM

## 2024-08-16 DIAGNOSIS — R10.32 PAIN, ABDOMINAL, LLQ: ICD-10-CM

## 2024-08-16 LAB
BILIRUB UR QL STRIP.AUTO: NEGATIVE
C TRACH DNA SPEC QL NAA+PROBE: NOT DETECTED
CLARITY UR: CLEAR
COLOR UR AUTO: YELLOW
GLUCOSE UR QL STRIP: NEGATIVE
HGB UR QL STRIP: NEGATIVE
KETONES UR QL STRIP: NEGATIVE
LEUKOCYTE ESTERASE UR QL STRIP: NEGATIVE
N GONORRHOEA DNA SPEC QL NAA+PROBE: NOT DETECTED
NITRITE UR QL STRIP: NEGATIVE
PH UR STRIP: 6 [PH]
PROT UR QL STRIP: NEGATIVE
SOURCE (OHS): NORMAL
SP GR UR STRIP.AUTO: 1.02 (ref 1–1.03)
T VAGINALIS GENITAL QL WET PREP: NORMAL
UROBILINOGEN UR STRIP-ACNC: 0.2

## 2024-08-16 PROCEDURE — 99213 OFFICE O/P EST LOW 20 MIN: CPT | Mod: ,,,

## 2024-08-16 PROCEDURE — 87491 CHLMYD TRACH DNA AMP PROBE: CPT

## 2024-08-16 PROCEDURE — 81003 URINALYSIS AUTO W/O SCOPE: CPT

## 2024-08-16 PROCEDURE — 3078F DIAST BP <80 MM HG: CPT | Mod: CPTII,,,

## 2024-08-16 PROCEDURE — 1159F MED LIST DOCD IN RCRD: CPT | Mod: CPTII,,,

## 2024-08-16 PROCEDURE — 3074F SYST BP LT 130 MM HG: CPT | Mod: CPTII,,,

## 2024-08-16 PROCEDURE — 1160F RVW MEDS BY RX/DR IN RCRD: CPT | Mod: CPTII,,,

## 2024-08-16 PROCEDURE — 87210 SMEAR WET MOUNT SALINE/INK: CPT

## 2024-08-16 PROCEDURE — 87591 N.GONORRHOEAE DNA AMP PROB: CPT

## 2024-08-16 PROCEDURE — 3008F BODY MASS INDEX DOCD: CPT | Mod: CPTII,,,

## 2024-08-16 NOTE — PROGRESS NOTES
Patient ID: [id number]     Chief Complaint: Abdominal Pain (Left lower quadrant pain x 1 week/Cramping pain 7/10/Requesting std testing)    HPI:     Adrienne Caba is a 29 y.o. female here today for left lower quadrant pain x 1 week. She describes the pain as cramping that is intermittent. She does not notice if the pain starts when she eats or just around her menstrual cycle. Bowel movements are regular. She has been Her last menstrual cycle was . She is also requesting to have STD testing. She had intercourse with two different partners since May 2024 at her last visit. She denies any vaginal discharge and fevers.     Past Medical History:   Diagnosis Date    Amenorrhea, unspecified     Bacterial vaginosis     Chronic abdominal pain     Excessive daytime sleepiness     Free fluid in pelvis     Pelvic pain     Unspecified hemorrhoids     Urinary frequency         Past Surgical History:   Procedure Laterality Date     SECTION  09/15/2015     SECTION  2013    CHOLECYSTECTOMY  2011    HERNIA REPAIR  2018        Social History     Socioeconomic History    Marital status: Single   Tobacco Use    Smoking status: Former     Current packs/day: 0.00     Average packs/day: 1 pack/day for 14.0 years (14.0 ttl pk-yrs)     Types: Cigarettes, Vaping with nicotine     Start date: 3/12/2009     Quit date: 3/12/2023     Years since quittin.4    Smokeless tobacco: Never   Substance and Sexual Activity    Alcohol use: Not Currently    Drug use: Never    Sexual activity: Yes     Partners: Male     Birth control/protection: None     Social Determinants of Health     Financial Resource Strain: Low Risk  (2023)    Overall Financial Resource Strain (CARDIA)     Difficulty of Paying Living Expenses: Not hard at all   Food Insecurity: No Food Insecurity (2023)    Hunger Vital Sign     Worried About Running Out of Food in the Last Year: Never true     Ran Out of Food in the Last Year:  "Never true   Transportation Needs: No Transportation Needs (5/8/2023)    PRAPARE - Transportation     Lack of Transportation (Medical): No     Lack of Transportation (Non-Medical): No   Physical Activity: Sufficiently Active (5/8/2023)    Exercise Vital Sign     Days of Exercise per Week: 7 days     Minutes of Exercise per Session: 30 min   Stress: Stress Concern Present (5/8/2023)    [employer] - Occupational Stress Questionnaire     Feeling of Stress : To some extent   Housing Stability: Low Risk  (5/8/2023)    Housing Stability Vital Sign     Unable to Pay for Housing in the Last Year: No     Number of Places Lived in the Last Year: 1     Unstable Housing in the Last Year: No        Current Outpatient Medications   Medication Instructions    dicyclomine (BENTYL) 10 mg, Oral, 2 times daily       Review of patient's allergies indicates:   Allergen Reactions    Ampicillin Hives    Penicillin Rash and Nausea And Vomiting        Patient Care Team:  Tru Corley DO as PCP - General (Family Medicine)  Mike Joseph MD (Obstetrics and Gynecology)  Jimenez August MD as Consulting Physician (Obstetrics and Gynecology)     Subjective:     Review of Systems    12 point review of systems conducted, negative except as stated in the history of present illness. See HPI for details.    Objective:     Visit Vitals  BP 99/65 (BP Location: Right arm, Patient Position: Sitting, BP Method: Large (Automatic))   Pulse 62   Temp 97.6 °F (36.4 °C)   Resp 20   Ht 4' 11" (1.499 m)   Wt 59.5 kg (131 lb 3.2 oz)   LMP 07/23/2024   SpO2 98%   BMI 26.50 kg/m²       Physical Exam  Vitals and nursing note reviewed.   Constitutional:       General: She is not in acute distress.     Appearance: She is not ill-appearing.   HENT:      Head: Normocephalic and atraumatic.      Mouth/Throat:      Mouth: Mucous membranes are moist.      Pharynx: Oropharynx is clear.   Eyes:      General: No scleral icterus.     " Extraocular Movements: Extraocular movements intact.      Conjunctiva/sclera: Conjunctivae normal.      Pupils: Pupils are equal, round, and reactive to light.   Neck:      Vascular: No carotid bruit.   Cardiovascular:      Rate and Rhythm: Normal rate and regular rhythm.      Heart sounds: No murmur heard.     No friction rub. No gallop.   Pulmonary:      Effort: Pulmonary effort is normal. No respiratory distress.      Breath sounds: Normal breath sounds. No wheezing, rhonchi or rales.   Abdominal:      General: Abdomen is flat. Bowel sounds are normal. There is no distension.      Palpations: Abdomen is soft. There is no mass.      Tenderness: There is generalized abdominal tenderness and tenderness in the left lower quadrant.   Musculoskeletal:         General: Normal range of motion.      Cervical back: Normal range of motion and neck supple.   Skin:     General: Skin is warm and dry.   Neurological:      General: No focal deficit present.      Mental Status: She is alert.   Psychiatric:         Mood and Affect: Mood normal.       Labs Reviewed:     Chemistry:  Lab Results   Component Value Date     03/11/2024    K 3.7 03/11/2024    BUN 11.0 03/11/2024    CREATININE 0.80 03/11/2024    EGFRNORACEVR >60 03/11/2024    GLUCOSE 111 (H) 03/11/2024    CALCIUM 9.4 03/11/2024    ALKPHOS 74 03/11/2024    LABPROT 7.2 03/11/2024    ALBUMIN 3.7 03/11/2024    AST 12 03/11/2024    ALT 13 03/11/2024    TSH 1.866 03/11/2024    [id number] 0.95 03/11/2024      Hematology:  Lab Results   Component Value Date    WBC 5.97 03/11/2024    HGB 14.0 03/11/2024    HCT 41.5 03/11/2024     03/11/2024       Lipid Panel:  Lab Results   Component Value Date    CHOL 203 (H) 03/11/2024    HDL 34 (L) 03/11/2024    .00 03/11/2024    TRIG 155 (H) 03/11/2024    TOTALCHOLEST 6 (H) 03/11/2024        Urine:  Lab Results   Component Value Date    APPEARANCEUA Clear 08/16/2024    SGUA 1.025 08/16/2024    PROTEINUA Negative 08/16/2024     KETONESUA Negative 08/16/2024    LEUKOCYTESUR Negative 08/16/2024    RBCUA 51-99 (A) 05/22/2024    WBCUA 3-5 05/22/2024    BACTERIA Moderate (A) 05/22/2024        Assessment:       ICD-10-CM ICD-9-CM   1. Pain, abdominal, LLQ  R10.32 789.04   2. Has multiple sexual partners  Z91.89 V15.89        Plan:     1. Pain, abdominal, LLQ  -     Urinalysis, Reflex to Urine Culture; Future; Expected date: 08/16/2024  -     Chlamydia/GC, PCR; Future; Expected date: 08/16/2024  -     Trichomonas Prep Wet Mount; Future; Expected date: 08/16/2024  -     dicyclomine (BENTYL) 10 MG capsule; Take 1 capsule (10 mg total) by mouth 2 (two) times daily.  Dispense: 60 capsule; Refill: 0    2. Has multiple sexual partners  -     Chlamydia/GC, PCR; Future; Expected date: 08/16/2024  -     Trichomonas Prep Wet Mount; Future; Expected date: 08/16/2024       Follow up if symptoms worsen or fail to improve. In addition to their scheduled follow up, the patient has also been instructed to follow up on as needed basis.     Future Appointments   Date Time Provider Department Center   3/13/2025 10:00 AM Terrell Prasad NP [facility] MD Terrell Prasad NP

## 2024-08-19 ENCOUNTER — TELEPHONE (OUTPATIENT)
Dept: FAMILY MEDICINE | Facility: CLINIC | Age: 29
End: 2024-08-19
Payer: MEDICAID

## 2024-08-19 PROBLEM — R10.32 PAIN, ABDOMINAL, LLQ: Status: ACTIVE | Noted: 2024-08-19

## 2024-08-19 RX ORDER — DICYCLOMINE HYDROCHLORIDE 10 MG/1
10 CAPSULE ORAL 2 TIMES DAILY
Qty: 60 CAPSULE | Refills: 0 | Status: SHIPPED | OUTPATIENT
Start: 2024-08-19 | End: 2024-09-18

## 2024-08-19 NOTE — TELEPHONE ENCOUNTER
----- Message from Terrell Prasad NP sent at 8/19/2024 10:13 AM CDT -----  Please inform patient of lab results.     1. All STD testing is negative. No UTI.     2. Bentyl 10 mg BID sent in for abdominal cramping.     Thanks for all you do,   Terrell

## 2025-02-18 DIAGNOSIS — Z00.00 WELLNESS EXAMINATION: Primary | ICD-10-CM

## 2025-03-13 ENCOUNTER — OFFICE VISIT (OUTPATIENT)
Dept: FAMILY MEDICINE | Facility: CLINIC | Age: 30
End: 2025-03-13
Payer: MEDICAID

## 2025-03-13 VITALS
RESPIRATION RATE: 16 BRPM | TEMPERATURE: 98 F | DIASTOLIC BLOOD PRESSURE: 72 MMHG | HEART RATE: 70 BPM | WEIGHT: 140.81 LBS | SYSTOLIC BLOOD PRESSURE: 111 MMHG | OXYGEN SATURATION: 98 % | HEIGHT: 59 IN | BODY MASS INDEX: 28.39 KG/M2

## 2025-03-13 DIAGNOSIS — E66.3 OVERWEIGHT WITH BODY MASS INDEX (BMI) OF 28 TO 28.9 IN ADULT: ICD-10-CM

## 2025-03-13 DIAGNOSIS — Z00.00 WELLNESS EXAMINATION: Primary | ICD-10-CM

## 2025-03-13 DIAGNOSIS — Z80.41 FAMILY HISTORY OF OVARIAN CANCER: ICD-10-CM

## 2025-03-13 DIAGNOSIS — E78.00 ELEVATED LDL CHOLESTEROL LEVEL: ICD-10-CM

## 2025-03-13 PROBLEM — E78.2 MIXED HYPERLIPIDEMIA: Status: RESOLVED | Noted: 2024-03-12 | Resolved: 2025-03-13

## 2025-03-13 PROBLEM — R10.32 PAIN, ABDOMINAL, LLQ: Status: RESOLVED | Noted: 2024-08-19 | Resolved: 2025-03-13

## 2025-03-13 PROCEDURE — 1159F MED LIST DOCD IN RCRD: CPT | Mod: CPTII,,,

## 2025-03-13 PROCEDURE — 1160F RVW MEDS BY RX/DR IN RCRD: CPT | Mod: CPTII,,,

## 2025-03-13 PROCEDURE — 3008F BODY MASS INDEX DOCD: CPT | Mod: CPTII,,,

## 2025-03-13 PROCEDURE — 3078F DIAST BP <80 MM HG: CPT | Mod: CPTII,,,

## 2025-03-13 PROCEDURE — 99395 PREV VISIT EST AGE 18-39: CPT | Mod: ,,,

## 2025-03-13 PROCEDURE — 3074F SYST BP LT 130 MM HG: CPT | Mod: CPTII,,,

## 2025-03-13 NOTE — ASSESSMENT & PLAN NOTE
Noted family history of ovarian cancer.  Evaluated last pap smear from July 2023, which was negative for intraepithelial lesions or malignancy.  Educated the patient on the importance of regular pap smears and monitoring for symptoms related to ovarian cancer risk (abdominal pain, abnormal periods, bloating, persistent back pain).  Advised to continue regular follow-ups with Dr. Joseph for pap smears.  Scheduled next pap smear for July 2026.  Instructed the patient to follow up regularly with gynecologist.

## 2025-03-13 NOTE — ASSESSMENT & PLAN NOTE
Calculated and monitored the patient's BMI, which is 28.44, indicating overweight status.  Acknowledged the patient's overweight status and provided dietary advice.  Recommend dietary and lifestyle modifications to manage weight.  Increase intake of healthy fats, fresh fruits, vegetables, and lean meats.  Avoid processed, fried, greasy, and fatty foods.  Increase fiber intake and water consumption.  Advised the patient to follow the provided recommendations for weight management.

## 2025-03-13 NOTE — ASSESSMENT & PLAN NOTE
Monitored cholesterol levels through labs.  Evaluated LDL cholesterol level, which is 133, above the desired level of less than 100.  Provided dietary recommendations to manage cholesterol levels.  Instructed the patient to follow the dietary advice to lower LDL cholesterol.  Adrienne to increase fiber intake and water consumption.  Adrienne to avoid processed foods, fried foods, greasy foods, and fatty foods.  Adrienne to increase intake of healthy fats (e.g., avocado, olive oil), eat more fresh fruits and vegetables, and consume lean meats.

## 2025-03-13 NOTE — PROGRESS NOTES
Patient ID: [id number]     Chief Complaint: Wellness      HPI:     Adrienne presents today for a wellness appointment.    FAMILY HISTORY:  She reports family history of ovarian cancer. She denies family history of breast or colon cancer.    GYNECOLOGIC HISTORY:  Last pap smear in 2023 was negative for intraepithelial lesions or malignancy.    MEDICATIONS:  She denies taking any medications.    LABS:  Electrolytes, kidney function, liver function, and thyroid levels are normal.   Cholesterol panel is within normal limits with elevated LDL of 133 mg/dL.         Past Medical History:   Diagnosis Date    Amenorrhea, unspecified     Bacterial vaginosis     Chronic abdominal pain     Excessive daytime sleepiness     Free fluid in pelvis     Mixed hyperlipidemia 2024    Pain, abdominal, LLQ 2024    Pelvic pain     Unspecified hemorrhoids     Urinary frequency         Past Surgical History:   Procedure Laterality Date     SECTION  09/15/2015     SECTION  2013    CHOLECYSTECTOMY  2011    HERNIA REPAIR  2018        Social History     Socioeconomic History    Marital status: Single   Tobacco Use    Smoking status: Former     Current packs/day: 0.00     Average packs/day: 1 pack/day for 14.0 years (14.0 ttl pk-yrs)     Types: Cigarettes, Vaping with nicotine     Start date: 3/12/2009     Quit date: 3/12/2023     Years since quittin.0    Smokeless tobacco: Never   Substance and Sexual Activity    Alcohol use: Not Currently    Drug use: Never    Sexual activity: Yes     Partners: Male     Birth control/protection: None     Social Drivers of Health     Financial Resource Strain: Low Risk  (2023)    Overall Financial Resource Strain (CARDIA)     Difficulty of Paying Living Expenses: Not hard at all   Food Insecurity: No Food Insecurity (2023)    Hunger Vital Sign     Worried About Running Out of Food in the Last Year: Never true     Ran Out of Food in the Last Year: Never true  "  Transportation Needs: No Transportation Needs (5/8/2023)    PRAPARE - Transportation     Lack of Transportation (Medical): No     Lack of Transportation (Non-Medical): No   Physical Activity: Sufficiently Active (5/8/2023)    Exercise Vital Sign     Days of Exercise per Week: 7 days     Minutes of Exercise per Session: 30 min   Stress: Stress Concern Present (5/8/2023)    [employer] - Occupational Stress Questionnaire     Feeling of Stress : To some extent   Housing Stability: Low Risk  (5/8/2023)    Housing Stability Vital Sign     Unable to Pay for Housing in the Last Year: No     Number of Places Lived in the Last Year: 1     Unstable Housing in the Last Year: No        No current outpatient medications    Review of patient's allergies indicates:   Allergen Reactions    Ampicillin Hives    Penicillin Rash and Nausea And Vomiting        Patient Care Team:  Tru Corley DO as PCP - General (Family Medicine)  Mike Joseph MD (Obstetrics and Gynecology)  Jimenez August MD as Consulting Physician (Obstetrics and Gynecology)     Subjective:     Review of Systems    12 point review of systems conducted, negative except as stated in the history of present illness. See HPI for details.    Objective:     Visit Vitals  /72 (BP Location: Right arm, Patient Position: Sitting)   Pulse 70   Temp 98 °F (36.7 °C) (Oral)   Resp 16   Ht 4' 11" (1.499 m)   Wt 63.9 kg (140 lb 12.8 oz)   LMP 02/26/2025   SpO2 98%   BMI 28.44 kg/m²       Physical Exam  Vitals and nursing note reviewed.   Constitutional:       General: She is not in acute distress.     Appearance: She is not ill-appearing.   HENT:      Head: Normocephalic and atraumatic.      Mouth/Throat:      Mouth: Mucous membranes are moist.      Pharynx: Oropharynx is clear.   Eyes:      General: No scleral icterus.     Extraocular Movements: Extraocular movements intact.      Conjunctiva/sclera: Conjunctivae normal.      " Pupils: Pupils are equal, round, and reactive to light.   Neck:      Vascular: No carotid bruit.   Cardiovascular:      Rate and Rhythm: Normal rate and regular rhythm.      Heart sounds: No murmur heard.     No friction rub. No gallop.   Pulmonary:      Effort: Pulmonary effort is normal. No respiratory distress.      Breath sounds: Normal breath sounds. No wheezing, rhonchi or rales.   Abdominal:      General: Abdomen is flat. Bowel sounds are normal. There is no distension.      Palpations: Abdomen is soft. There is no mass.      Tenderness: There is no abdominal tenderness.   Musculoskeletal:         General: Normal range of motion.      Cervical back: Normal range of motion and neck supple.   Skin:     General: Skin is warm and dry.   Neurological:      General: No focal deficit present.      Mental Status: She is alert.   Psychiatric:         Mood and Affect: Mood normal.       Labs Reviewed:     Chemistry:  Lab Results   Component Value Date     03/11/2024    K 3.7 03/11/2024    BUN 11.0 03/11/2024    CREATININE 0.80 03/11/2024    EGFRNORACEVR >60 03/11/2024    GLUCOSE 111 (H) 03/11/2024    CALCIUM 9.4 03/11/2024    ALKPHOS 74 03/11/2024    LABPROT 7.2 03/11/2024    ALBUMIN 3.7 03/11/2024    AST 12 03/11/2024    ALT 13 03/11/2024    TSH 1.866 03/11/2024    COYYID9DMWY 0.95 03/11/2024     Hematology:  Lab Results   Component Value Date    WBC 5.97 03/11/2024    HGB 14.0 03/11/2024    HCT 41.5 03/11/2024     03/11/2024       Lipid Panel:  Lab Results   Component Value Date    CHOL 203 (H) 03/11/2024    HDL 34 (L) 03/11/2024    .00 03/11/2024    TRIG 155 (H) 03/11/2024    TOTALCHOLEST 6 (H) 03/11/2024        Urine:  Lab Results   Component Value Date    APPEARANCEUA Clear 08/16/2024    SGUA 1.025 08/16/2024    PROTEINUA Negative 08/16/2024    KETONESUA Negative 08/16/2024    LEUKOCYTESUR Negative 08/16/2024    RBCUA 51-99 (A) 05/22/2024    WBCUA 3-5 05/22/2024    BACTERIA Moderate (A)  05/22/2024        Assessment:       ICD-10-CM ICD-9-CM   1. Wellness examination  Z00.00 V70.0   2. Elevated LDL cholesterol level  E78.00 272.0   3. Overweight with body mass index (BMI) of 28 to 28.9 in adult  E66.3 278.02    Z68.28 V85.24   4. Family history of ovarian cancer  Z80.41 V16.41     Plan:     Cervical Cancer Screening -  Last Pap on 07/2023. Follow up with [facility] Clinic for Pap/Pelvic.      Breast Cancer Screening - Recommended at age 40.     Colon Cancer Screening - Recommended at age 45.     Eye Exam - Recommend annually.    Dental Exam - Recommend biannual exams.     Vaccinations -   Immunization History   Administered Date(s) Administered    DTP 1995, 1995, 1995, 02/25/1997    DTaP 02/09/2000    HIB 1995, 1995, 09/16/1996, 02/25/1997    HPV 9-Valent 09/18/2009, 03/17/2010, 07/12/2010    HPV Quadrivalent 09/18/2009, 03/17/2010, 07/12/2010    Hepatitis A, Pediatric/Adolescent, 2 Dose 07/10/2006, 10/25/2010    Hepatitis B 1995, 1995, 1995    Hepatitis B, Adult 1995, 1995, 1995    HiB PRP-T 1995, 1995, 09/15/1996, 02/25/1997    Influenza - Trivalent - Fluarix, Flulaval, Fluzone, Afluria - PF 10/31/2019    Influenza A (H1N1) 2009 Monovalent - IM 11/30/2009    MMR 09/16/1996, 02/09/2000    Meningococcal Conjugate (MCV4P) 11/30/2009    OPV 1995, 1995, 1995, 09/16/1996    Pneumococcal Conjugate - 13 Valent 01/22/2002    Pneumococcal Conjugate - 7 Valent 01/22/2002    Tdap 07/10/2006, 10/31/2019          1. Wellness examination    2. Elevated LDL cholesterol level  Assessment & Plan:  Monitored cholesterol levels through labs.  Evaluated LDL cholesterol level, which is 133, above the desired level of less than 100.  Provided dietary recommendations to manage cholesterol levels.  Instructed the patient to follow the dietary advice to lower LDL cholesterol.  Adrienne to increase fiber intake and water  consumption.  Adrienne to avoid processed foods, fried foods, greasy foods, and fatty foods.  Adrienne to increase intake of healthy fats (e.g., avocado, olive oil), eat more fresh fruits and vegetables, and consume lean meats.      3. Overweight with body mass index (BMI) of 28 to 28.9 in adult  Assessment & Plan:  Calculated and monitored the patient's BMI, which is 28.44, indicating overweight status.  Acknowledged the patient's overweight status and provided dietary advice.  Recommend dietary and lifestyle modifications to manage weight.  Increase intake of healthy fats, fresh fruits, vegetables, and lean meats.  Avoid processed, fried, greasy, and fatty foods.  Increase fiber intake and water consumption.  Advised the patient to follow the provided recommendations for weight management.      4. Family history of ovarian cancer  Assessment & Plan:  Noted family history of ovarian cancer.  Evaluated last pap smear from July 2023, which was negative for intraepithelial lesions or malignancy.  Educated the patient on the importance of regular pap smears and monitoring for symptoms related to ovarian cancer risk (abdominal pain, abnormal periods, bloating, persistent back pain).  Advised to continue regular follow-ups with Dr. Joseph for pap smears.  Scheduled next pap smear for July 2026.  Instructed the patient to follow up regularly with gynecologist.        Follow up in about 1 year (around 3/13/2026) for Wellness. In addition to their scheduled follow up, the patient has also been instructed to follow up on as needed basis.     This note was generated with the assistance of ambient listening technology. Verbal consent was obtained by the patient and accompanying visitor(s) for the recording of patient appointment to facilitate this note. I attest to having reviewed and edited the generated note for accuracy, though some syntax or spelling errors may persist. Please contact the author of this note for any  clarification.    Terrell Prasad, Adult-Gerontology NP

## 2025-07-01 ENCOUNTER — LAB VISIT (OUTPATIENT)
Dept: LAB | Facility: HOSPITAL | Age: 30
End: 2025-07-01
Attending: FAMILY MEDICINE
Payer: MEDICAID

## 2025-07-01 ENCOUNTER — OFFICE VISIT (OUTPATIENT)
Dept: FAMILY MEDICINE | Facility: CLINIC | Age: 30
End: 2025-07-01
Payer: MEDICAID

## 2025-07-01 VITALS
WEIGHT: 155.63 LBS | RESPIRATION RATE: 20 BRPM | HEART RATE: 91 BPM | TEMPERATURE: 98 F | DIASTOLIC BLOOD PRESSURE: 79 MMHG | BODY MASS INDEX: 31.37 KG/M2 | SYSTOLIC BLOOD PRESSURE: 110 MMHG | HEIGHT: 59 IN | OXYGEN SATURATION: 98 %

## 2025-07-01 DIAGNOSIS — N92.6 LATE MENSES: ICD-10-CM

## 2025-07-01 DIAGNOSIS — N92.6 LATE MENSES: Primary | ICD-10-CM

## 2025-07-01 DIAGNOSIS — Z00.00 WELLNESS EXAMINATION: ICD-10-CM

## 2025-07-01 LAB
ALBUMIN SERPL-MCNC: 3.8 G/DL (ref 3.5–5)
ALBUMIN/GLOB SERPL: 0.9 RATIO (ref 1.1–2)
ALP SERPL-CCNC: 80 UNIT/L (ref 40–150)
ALT SERPL-CCNC: 14 UNIT/L (ref 0–55)
ANION GAP SERPL CALC-SCNC: 12 MEQ/L
AST SERPL-CCNC: 13 UNIT/L (ref 11–45)
B-HCG UR QL: NEGATIVE
BASOPHILS # BLD AUTO: 0.03 X10(3)/MCL
BASOPHILS NFR BLD AUTO: 0.4 %
BILIRUB SERPL-MCNC: 0.5 MG/DL
BUN SERPL-MCNC: 8 MG/DL (ref 7–18.7)
CALCIUM SERPL-MCNC: 10.1 MG/DL (ref 8.4–10.2)
CHLORIDE SERPL-SCNC: 108 MMOL/L (ref 98–107)
CHOLEST SERPL-MCNC: 229 MG/DL
CHOLEST/HDLC SERPL: 6 {RATIO} (ref 0–5)
CO2 SERPL-SCNC: 22 MMOL/L (ref 22–29)
CREAT SERPL-MCNC: 0.81 MG/DL (ref 0.55–1.02)
CREAT/UREA NIT SERPL: 10
CTP QC/QA: YES
EOSINOPHIL # BLD AUTO: 0.08 X10(3)/MCL (ref 0–0.9)
EOSINOPHIL NFR BLD AUTO: 1 %
ERYTHROCYTE [DISTWIDTH] IN BLOOD BY AUTOMATED COUNT: 13 % (ref 11.5–17)
GFR SERPLBLD CREATININE-BSD FMLA CKD-EPI: >60 ML/MIN/1.73/M2
GLOBULIN SER-MCNC: 4.1 GM/DL (ref 2.4–3.5)
GLUCOSE SERPL-MCNC: 97 MG/DL (ref 74–100)
HCT VFR BLD AUTO: 42 % (ref 37–47)
HDLC SERPL-MCNC: 37 MG/DL (ref 35–60)
HGB BLD-MCNC: 14.6 G/DL (ref 12–16)
IMM GRANULOCYTES # BLD AUTO: 0.02 X10(3)/MCL (ref 0–0.04)
IMM GRANULOCYTES NFR BLD AUTO: 0.3 %
LDLC SERPL CALC-MCNC: 148 MG/DL (ref 50–140)
LYMPHOCYTES # BLD AUTO: 1.83 X10(3)/MCL (ref 0.6–4.6)
LYMPHOCYTES NFR BLD AUTO: 23.7 %
MCH RBC QN AUTO: 28.9 PG (ref 27–31)
MCHC RBC AUTO-ENTMCNC: 34.8 G/DL (ref 33–36)
MCV RBC AUTO: 83 FL (ref 80–94)
MONOCYTES # BLD AUTO: 0.33 X10(3)/MCL (ref 0.1–1.3)
MONOCYTES NFR BLD AUTO: 4.3 %
NEUTROPHILS # BLD AUTO: 5.44 X10(3)/MCL (ref 2.1–9.2)
NEUTROPHILS NFR BLD AUTO: 70.3 %
NRBC BLD AUTO-RTO: 0 %
PLATELET # BLD AUTO: 289 X10(3)/MCL (ref 130–400)
PMV BLD AUTO: 10.2 FL (ref 7.4–10.4)
POTASSIUM SERPL-SCNC: 3.9 MMOL/L (ref 3.5–5.1)
PROT SERPL-MCNC: 7.9 GM/DL (ref 6.4–8.3)
RBC # BLD AUTO: 5.06 X10(6)/MCL (ref 4.2–5.4)
SODIUM SERPL-SCNC: 142 MMOL/L (ref 136–145)
TRIGL SERPL-MCNC: 219 MG/DL (ref 37–140)
VLDLC SERPL CALC-MCNC: 44 MG/DL
WBC # BLD AUTO: 7.73 X10(3)/MCL (ref 4.5–11.5)

## 2025-07-01 PROCEDURE — 1160F RVW MEDS BY RX/DR IN RCRD: CPT | Mod: CPTII,,, | Performed by: FAMILY MEDICINE

## 2025-07-01 PROCEDURE — 3008F BODY MASS INDEX DOCD: CPT | Mod: CPTII,,, | Performed by: FAMILY MEDICINE

## 2025-07-01 PROCEDURE — 3074F SYST BP LT 130 MM HG: CPT | Mod: CPTII,,, | Performed by: FAMILY MEDICINE

## 2025-07-01 PROCEDURE — 3078F DIAST BP <80 MM HG: CPT | Mod: CPTII,,, | Performed by: FAMILY MEDICINE

## 2025-07-01 PROCEDURE — 80061 LIPID PANEL: CPT

## 2025-07-01 PROCEDURE — 80053 COMPREHEN METABOLIC PANEL: CPT

## 2025-07-01 PROCEDURE — 36415 COLL VENOUS BLD VENIPUNCTURE: CPT

## 2025-07-01 PROCEDURE — 81025 URINE PREGNANCY TEST: CPT | Mod: ,,, | Performed by: FAMILY MEDICINE

## 2025-07-01 PROCEDURE — 1159F MED LIST DOCD IN RCRD: CPT | Mod: CPTII,,, | Performed by: FAMILY MEDICINE

## 2025-07-01 PROCEDURE — 85025 COMPLETE CBC W/AUTO DIFF WBC: CPT

## 2025-07-01 PROCEDURE — 84702 CHORIONIC GONADOTROPIN TEST: CPT

## 2025-07-01 PROCEDURE — 99213 OFFICE O/P EST LOW 20 MIN: CPT | Mod: ,,, | Performed by: FAMILY MEDICINE

## 2025-07-01 NOTE — PROGRESS NOTES
"   Patient ID: [id number]     Chief Complaint: pregnacy concern (17 days late. Home pregnancy is neg)    HPI:     Adrienne Caba is a 30 y.o. female here today for pregnacy concern (17 days late. Home pregnancy is neg) No other complaints today.     History of Present Illness               -------------------------------------    Amenorrhea, unspecified    Bacterial vaginosis    Chronic abdominal pain    Excessive daytime sleepiness    Free fluid in pelvis    Mixed hyperlipidemia    Pain, abdominal, LLQ    Pelvic pain    Unspecified hemorrhoids    Urinary frequency        Past Surgical History:   Procedure Laterality Date     SECTION  09/15/2015     SECTION  2013    CHOLECYSTECTOMY      HERNIA REPAIR  2018       Review of patient's allergies indicates:   Allergen Reactions    Ampicillin Hives    Penicillin Rash and Nausea And Vomiting       No outpatient medications have been marked as taking for the 25 encounter (Office Visit) with Tru Corley DO.       Social History[1]     Family History   Problem Relation Name Age of Onset    Diabetes Mellitus Mother Meche Caba     Hyperlipidemia Mother Meche Caba     Diabetes Mother Meche Caba     Hypertension Father Bakari Caba     Learning disabilities Daughter Chrissy Ryan     Learning disabilities Son Karla Caba         Patient Care Team:  Tru Corley DO as PCP - General (Family Medicine)  Mike Joseph MD (Obstetrics and Gynecology)  Jimenez August MD as Consulting Physician (Obstetrics and Gynecology)     Subjective:     ROS    See HPI for details  All Other ROS: Negative except as stated in HPI.       Objective:     /79 (BP Location: Left arm, Patient Position: Sitting)   Pulse 91   Temp 97.6 °F (36.4 °C) (Temporal)   Resp 20   Ht 4' 11" (1.499 m)   Wt 70.6 kg (155 lb 9.6 oz)   LMP 2025 (Exact Date)   SpO2 98%   BMI 31.43 kg/m²     Physical Exam  Vitals reviewed. "   Constitutional:       General: She is not in acute distress.     Appearance: Normal appearance. She is obese.   Cardiovascular:      Rate and Rhythm: Normal rate and regular rhythm.      Heart sounds: No murmur heard.     No friction rub. No gallop.   Pulmonary:      Effort: No respiratory distress.      Breath sounds: No wheezing, rhonchi or rales.   Musculoskeletal:         General: No swelling, tenderness or deformity.      Right lower leg: No edema.      Left lower leg: No edema.   Skin:     General: Skin is warm and dry.      Findings: No lesion or rash.   Neurological:      General: No focal deficit present.      Mental Status: She is alert.   Psychiatric:         Mood and Affect: Mood normal.         Assessment/Plan:     1. Late menses  -     POCT Urine Pregnancy  -     HCG, Quantitative; Future; Expected date: 2025          Assessment & Plan               This note was generated with the assistance of ambient listening technology. Verbal consent was obtained by the patient and accompanying visitor(s) for the recording of patient appointment to facilitate this note. I attest to having reviewed and edited the generated note for accuracy, though some syntax or spelling errors may persist. Please contact the author of this note for any clarification.     Follow up:     No follow-ups on file. In addition to their scheduled follow up, the patient has also been instructed to follow up on as needed basis.                [1]   Social History  Socioeconomic History    Marital status: Single   Tobacco Use    Smoking status: Former     Current packs/day: 0.00     Average packs/day: 1 pack/day for 14.0 years (14.0 ttl pk-yrs)     Types: Cigarettes, Vaping with nicotine     Start date: 3/12/2009     Quit date: 3/12/2023     Years since quittin.3    Smokeless tobacco: Never   Substance and Sexual Activity    Alcohol use: Not Currently    Drug use: Never    Sexual activity: Yes     Partners: Male     Birth  control/protection: None     Social Drivers of Health     Financial Resource Strain: Medium Risk (7/1/2025)    Overall Financial Resource Strain (CARDIA)     Difficulty of Paying Living Expenses: Somewhat hard   Food Insecurity: Patient Declined (7/1/2025)    Hunger Vital Sign     Worried About Running Out of Food in the Last Year: Patient declined     Ran Out of Food in the Last Year: Patient declined   Transportation Needs: No Transportation Needs (7/1/2025)    PRAPARE - Transportation     Lack of Transportation (Medical): No     Lack of Transportation (Non-Medical): No   Physical Activity: Unknown (7/1/2025)    Exercise Vital Sign     Days of Exercise per Week: Patient declined     Minutes of Exercise per Session: 10 min   Stress: No Stress Concern Present (7/1/2025)    [employer] - Occupational Stress Questionnaire     Feeling of Stress : Only a little   Housing Stability: Unknown (7/1/2025)    Housing Stability Vital Sign     Unable to Pay for Housing in the Last Year: Patient declined     Number of Times Moved in the Last Year: 0     Homeless in the Last Year: No

## 2025-07-02 LAB — B-HCG FREE SERPL-ACNC: <2.42 MIU/ML

## 2025-07-08 ENCOUNTER — TELEPHONE (OUTPATIENT)
Dept: FAMILY MEDICINE | Facility: CLINIC | Age: 30
End: 2025-07-08
Payer: MEDICAID

## 2025-07-08 NOTE — TELEPHONE ENCOUNTER
----- Message from Tru Corley DO sent at 7/8/2025  2:11 PM CDT -----  Please inform patient of lab results.    1. Cholesterol is a little elevated but labs otherwise unremarkable.     2. Other labwork within acceptable ranges.    Thanks,    Dr. Corley  ----- Message -----  From: Lab, Background User  Sent: 7/1/2025   4:30 PM CDT  To: Tru Corley DO

## 2025-07-08 NOTE — TELEPHONE ENCOUNTER
Spoke with pt about results. Advised of diet changes to help lower cholesterol. Verbalized understanding

## 2025-07-14 ENCOUNTER — OFFICE VISIT (OUTPATIENT)
Dept: FAMILY MEDICINE | Facility: CLINIC | Age: 30
End: 2025-07-14
Payer: MEDICAID

## 2025-07-14 DIAGNOSIS — Z20.2 POTENTIAL EXPOSURE TO STD: ICD-10-CM

## 2025-07-14 DIAGNOSIS — N89.8 VAGINAL IRRITATION: Primary | ICD-10-CM

## 2025-07-14 RX ORDER — NORGESTIMATE AND ETHINYL ESTRADIOL 0.25-0.035
1 KIT ORAL DAILY
COMMUNITY
Start: 2025-07-07

## 2025-07-14 NOTE — PROGRESS NOTES
"  Patient ID: [id number]     Chief Complaint: Menstrual Problem (Lower abd. Pain/"Possible yeast infection")      HPI:     This is a telemedicine note. Patient was treated using telemedicine, real time audio and video, according to [facility] protocols. Terrell BECKFORD AGNP,  conducted the visit from the [facility]. The patient participated in the visit at a non-[facility] location selected by the patient, identified below. I am licensed in the state where the patient stated they are located. The patient stated that they understood and accepted the privacy and security risks to their information at their location.     Patient was located at the patient's home.     Adrienne presents today with vaginal discomfort and abdominal pain.    GYNECOLOGICAL HISTORY:  She recently started Sprintec birth control prescribed by Dr. Kuhn from Dr. Tesfaye's office to help regulate menstrual periods and is currently in the second week of medication. She is sexually active with a consistent partner, with last sexual encounter two days prior to visit. She denies any vaginal discharge.    MEDICAL HISTORY:  Recent labs demonstrated elevated cholesterol. She denies current fever and confirms ability to eat and drink without difficulty.       Past Medical History:   Diagnosis Date    Amenorrhea, unspecified     Bacterial vaginosis     Chronic abdominal pain     Excessive daytime sleepiness     Free fluid in pelvis     Mixed hyperlipidemia 2024    Pain, abdominal, LLQ 2024    Pelvic pain     Unspecified hemorrhoids     Urinary frequency         Past Surgical History:   Procedure Laterality Date     SECTION  09/15/2015     SECTION  2013    CHOLECYSTECTOMY  2011    HERNIA REPAIR  2018        Social History     Socioeconomic History    Marital status: Single   Tobacco Use    Smoking status: Former     Current packs/day: 0.00     Average packs/day: 1 pack/day for 14.0 years (14.0 ttl pk-yrs)    "  Types: Cigarettes, Vaping with nicotine     Start date: 3/12/2009     Quit date: 3/12/2023     Years since quittin.3    Smokeless tobacco: Never   Substance and Sexual Activity    Alcohol use: Not Currently    Drug use: Never    Sexual activity: Yes     Partners: Male     Birth control/protection: None     Social Drivers of Health     Financial Resource Strain: Medium Risk (2025)    Overall Financial Resource Strain (CARDIA)     Difficulty of Paying Living Expenses: Somewhat hard   Food Insecurity: Patient Declined (2025)    Hunger Vital Sign     Worried About Running Out of Food in the Last Year: Patient declined     Ran Out of Food in the Last Year: Patient declined   Transportation Needs: No Transportation Needs (2025)    PRAPARE - Transportation     Lack of Transportation (Medical): No     Lack of Transportation (Non-Medical): No   Physical Activity: Unknown (2025)    Exercise Vital Sign     Days of Exercise per Week: Patient declined     Minutes of Exercise per Session: 10 min   Stress: No Stress Concern Present (2025)    [employer] - Occupational Stress Questionnaire     Feeling of Stress : Only a little   Housing Stability: Unknown (2025)    Housing Stability Vital Sign     Unable to Pay for Housing in the Last Year: Patient declined     Number of Times Moved in the Last Year: 0     Homeless in the Last Year: No        Current Outpatient Medications   Medication Instructions    SPRINTEC, 28, 0.25-0.035 mg per tablet 1 tablet, Daily       Review of patient's allergies indicates:   Allergen Reactions    Ampicillin Hives    Penicillin Rash and Nausea And Vomiting        Patient Care Team:  Tru Corley DO as PCP - General (Family Medicine)  Mike Joseph MD (Obstetrics and Gynecology)  Jimenez August MD as Consulting Physician (Obstetrics and Gynecology)     Subjective:     Review of Systems    12 point review of systems conducted,  negative except as stated in the history of present illness. See HPI for details.    Objective:     Visit Vitals  LMP 05/07/2025 (Exact Date)       Physical Exam    Physical Exam: LIMITED DUE TO TELEMEDICINE RESTRICTIONS.  General: Alert and oriented, No acute distress.  Head: Normocephalic, Atraumatic.  Eye: Sclera non-icteric.  Neck/Thyroid:  Full range of motion.  Respiratory: Non-labored respirations, Symmetrical chest wall expansion.  Musculoskeletal: Normal range of motion.  Integumentary:  No visible suspicious lesions or rashes. No diaphoresis.   Neurologic: No focal deficits  Psychiatric: Normal interaction, Coherent speech, Euthymic mood, Appropriate affect       Assessment:       ICD-10-CM ICD-9-CM   1. Vaginal irritation  N89.8 623.9   2. Potential exposure to STD  Z20.2 V01.6        Plan:     1. Vaginal irritation  -     Urinalysis, Reflex to Urine Culture Urine, Clean Catch; Future; Expected date: 07/14/2025    2. Potential exposure to STD  -     Urinalysis, Reflex to Urine Culture Urine, Clean Catch; Future; Expected date: 07/14/2025  -     Sexually-Transmitted Infections (STIs) Increased Risk Panel; Future; Expected date: 07/14/2025    Ordered urinalysis and STD testing (gonorrhea and chlamydia)  Vaginal exam and wet prep to be performed at next appointment  Laboratory tests ordered for evaluation  Adrienne to complete labs before next appointment  Follow-up scheduled for Wednesday at 9:00 AM for vaginal exam, wet prep, and review of lab results    No follow-ups on file. In addition to their scheduled follow up, the patient has also been instructed to follow up on as needed basis.     Future Appointments   Date Time Provider Department Center   3/20/2026 10:40 AM Tru Corley DO [facility]       This note was generated with the assistance of ambient listening technology. Verbal consent was obtained by the patient and accompanying visitor(s) for the recording of patient appointment to  facilitate this note. I attest to having reviewed and edited the generated note for accuracy, though some syntax or spelling errors may persist. Please contact the author of this note for any clarification.    Video Time Documentation:  Spent 10 minutes with patient face to face discussed health concerns. More than 50% of this time was spent in counseling and coordination of care.    Terrell Prasad NP

## 2025-07-15 ENCOUNTER — LAB VISIT (OUTPATIENT)
Dept: LAB | Facility: HOSPITAL | Age: 30
End: 2025-07-15
Payer: MEDICAID

## 2025-07-15 DIAGNOSIS — N89.8 VAGINAL IRRITATION: ICD-10-CM

## 2025-07-15 DIAGNOSIS — Z20.2 POTENTIAL EXPOSURE TO STD: ICD-10-CM

## 2025-07-15 LAB
BILIRUB UR QL STRIP.AUTO: NEGATIVE
CLARITY UR: CLEAR
COLOR UR AUTO: YELLOW
GLUCOSE UR QL STRIP: NEGATIVE
HGB UR QL STRIP: NEGATIVE
KETONES UR QL STRIP: NEGATIVE
LEUKOCYTE ESTERASE UR QL STRIP: NEGATIVE
NITRITE UR QL STRIP: NEGATIVE
PH UR STRIP: 6 [PH]
PROT UR QL STRIP: NEGATIVE
SP GR UR STRIP.AUTO: >=1.03 (ref 1–1.03)
UROBILINOGEN UR STRIP-ACNC: 0.2

## 2025-07-15 PROCEDURE — 87661 TRICHOMONAS VAGINALIS AMPLIF: CPT

## 2025-07-15 PROCEDURE — 81003 URINALYSIS AUTO W/O SCOPE: CPT

## 2025-07-16 ENCOUNTER — OFFICE VISIT (OUTPATIENT)
Dept: FAMILY MEDICINE | Facility: CLINIC | Age: 30
End: 2025-07-16
Payer: MEDICAID

## 2025-07-16 VITALS
OXYGEN SATURATION: 97 % | HEIGHT: 59 IN | WEIGHT: 155.63 LBS | TEMPERATURE: 98 F | BODY MASS INDEX: 31.37 KG/M2 | SYSTOLIC BLOOD PRESSURE: 103 MMHG | HEART RATE: 78 BPM | RESPIRATION RATE: 18 BRPM | DIASTOLIC BLOOD PRESSURE: 72 MMHG

## 2025-07-16 DIAGNOSIS — N76.0 ACUTE VAGINITIS: Primary | ICD-10-CM

## 2025-07-16 LAB
C TRACH RRNA UR QL NAA+PROBE: NOT DETECTED
CLUE CELLS VAG QL WET PREP: ABNORMAL
N GONORRHOEA DNA UR QL NAA+PROBE: NOT DETECTED
T VAGINALIS RRNA UR QL NAA+PROBE: NOT DETECTED
T VAGINALIS VAG QL WET PREP: ABNORMAL
WBC #/AREA VAG WET PREP: ABNORMAL
YEAST SPEC QL WET PREP: ABNORMAL

## 2025-07-16 PROCEDURE — 3074F SYST BP LT 130 MM HG: CPT | Mod: CPTII,,,

## 2025-07-16 PROCEDURE — 1160F RVW MEDS BY RX/DR IN RCRD: CPT | Mod: CPTII,,,

## 2025-07-16 PROCEDURE — 3078F DIAST BP <80 MM HG: CPT | Mod: CPTII,,,

## 2025-07-16 PROCEDURE — 1159F MED LIST DOCD IN RCRD: CPT | Mod: CPTII,,,

## 2025-07-16 PROCEDURE — 87210 SMEAR WET MOUNT SALINE/INK: CPT

## 2025-07-16 PROCEDURE — 3008F BODY MASS INDEX DOCD: CPT | Mod: CPTII,,,

## 2025-07-16 PROCEDURE — 99213 OFFICE O/P EST LOW 20 MIN: CPT | Mod: ,,,

## 2025-07-16 RX ORDER — METRONIDAZOLE 7.5 MG/G
GEL VAGINAL
Qty: 1 APPLICATOR | Refills: 1 | Status: SHIPPED | OUTPATIENT
Start: 2025-07-16

## 2025-07-16 NOTE — ADDENDUM NOTE
Addended by: JESSICA WORKMAN on: 7/16/2025 10:54 AM     Modules accepted: Orders, Level of Service

## 2025-07-16 NOTE — PROGRESS NOTES
Patient ID: [id number]     Chief Complaint: Menstrual Problem (No period since May. /Negative on pregnancy tests)    HPI:     Adrienne presents today for follow up of vaginal discomfort.    VAGINAL DISCOMFORT:  She reports vaginal discomfort present for 3-4 days that is currently improving. She describes the discomfort as very uncomfortable but notes mild residual discomfort that is gradually resolving. She denies presence of blood or urinary tract infection symptoms. She has been self-treating with cranberry juice, which she typically uses for bladder infections.    LABS / TEST RESULTS:  Recent lab results include trichomonas negative, gonorrhea negative, chlamydia negative, and urinalysis negative with no blood and no evidence of urinary tract infection.    CONTRACEPTION:  She is currently on birth control for three months with follow-up with gynecologist scheduled for November.        Past Medical History:   Diagnosis Date    Amenorrhea, unspecified     Bacterial vaginosis     Chronic abdominal pain     Excessive daytime sleepiness     Free fluid in pelvis     Mixed hyperlipidemia 2024    Pain, abdominal, LLQ 2024    Pelvic pain     Unspecified hemorrhoids     Urinary frequency         Past Surgical History:   Procedure Laterality Date     SECTION  09/15/2015     SECTION  2013    CHOLECYSTECTOMY  2011    HERNIA REPAIR  2018        Social History     Socioeconomic History    Marital status: Single   Tobacco Use    Smoking status: Former     Current packs/day: 0.00     Average packs/day: 1 pack/day for 14.0 years (14.0 ttl pk-yrs)     Types: Cigarettes, Vaping with nicotine     Start date: 3/12/2009     Quit date: 3/12/2023     Years since quittin.3    Smokeless tobacco: Never   Substance and Sexual Activity    Alcohol use: Not Currently    Drug use: Never    Sexual activity: Yes     Partners: Male     Birth control/protection: None     Social Drivers of Health      Financial Resource Strain: Medium Risk (7/1/2025)    Overall Financial Resource Strain (CARDIA)     Difficulty of Paying Living Expenses: Somewhat hard   Food Insecurity: Patient Declined (7/1/2025)    Hunger Vital Sign     Worried About Running Out of Food in the Last Year: Patient declined     Ran Out of Food in the Last Year: Patient declined   Transportation Needs: No Transportation Needs (7/1/2025)    PRAPARE - Transportation     Lack of Transportation (Medical): No     Lack of Transportation (Non-Medical): No   Physical Activity: Unknown (7/1/2025)    Exercise Vital Sign     Days of Exercise per Week: Patient declined     Minutes of Exercise per Session: 10 min   Stress: No Stress Concern Present (7/1/2025)    [employer] - Occupational Stress Questionnaire     Feeling of Stress : Only a little   Housing Stability: Unknown (7/1/2025)    Housing Stability Vital Sign     Unable to Pay for Housing in the Last Year: Patient declined     Number of Times Moved in the Last Year: 0     Homeless in the Last Year: No        Current Outpatient Medications   Medication Instructions    metroNIDAZOLE (METROGEL) 0.75 % (37.5mg/5 gram) vaginal gel Insert 1 applicator full intravaginally before bedtime for 5 days    SPRINTEC, 28, 0.25-0.035 mg per tablet 1 tablet, Daily       Review of patient's allergies indicates:   Allergen Reactions    Ampicillin Hives    Penicillin Rash and Nausea And Vomiting        Patient Care Team:  Tru Corley DO as PCP - General (Family Medicine)  Mike Joseph MD (Obstetrics and Gynecology)  Jimenez August MD as Consulting Physician (Obstetrics and Gynecology)     Subjective:     Review of Systems    12 point review of systems conducted, negative except as stated in the history of present illness. See HPI for details.    Objective:     Visit Vitals  /72 (BP Location: Right arm, Patient Position: Sitting)   Pulse 78   Temp 98 °F (36.7 °C) (Oral)  "  Resp 18   Ht 4' 11" (1.499 m)   Wt 70.6 kg (155 lb 9.6 oz)   LMP 05/07/2025 (Exact Date)   SpO2 97%   BMI 31.43 kg/m²       Physical Exam    General: No acute distress. Well-developed. Well-nourished.  Eyes: EOMI. Sclerae anicteric.  HENT: Normocephalic. Atraumatic. Nares patent. Moist oral mucosa.  Ears: Bilateral TMs clear. Bilateral EACs clear.  Respiratory: Normal respiratory effort.   Abdomen: Soft. Non-tender. Non-distended. Normoactive bowel sounds.  Musculoskeletal: No  obvious deformity.  Extremities: No lower extremity edema.  Neurological: Alert & oriented x3. No slurred speech. Normal gait.  Psychiatric: Normal mood. Normal affect. Good insight. Good judgment.  Skin: Warm. Dry. No rash.  Female Genitourinary: White vaginal discharge present. Vagina and cervix erythematous. Tenderness on bilateral adnexal palpation.     Labs Reviewed:      Latest Reference Range & Units 07/15/25 08:51   N gonorrhoeae, amplified DNA Not Detected  Not Detected   Trichomonas vaginalis, DNA, urine Not Detected  Not Detected   Color, UA Yellow, Light-Yellow, Dark Yellow, Alysa, Straw  Yellow   Appearance, UA Clear  Clear   Specific Gravity,UA 1.005 - 1.030  >=1.030   pH, UA 5.0 - 8.5  6.0   Protein, UA Negative  Negative   Glucose, UA Negative, Normal  Negative   Ketones, UA Negative  Negative   Blood, UA Negative  Negative   NITRITE UA Negative  Negative   Bilirubin (UA) Negative  Negative   Urobilinogen, UA 0.2, 1.0, Normal  0.2   Leukocyte Esterase, UA Negative  Negative   Chlamydia trachomatis Amp DNA Not Detected  Not Detected     Assessment:       ICD-10-CM ICD-9-CM   1. Acute vaginitis  N76.0 616.10     Plan:     1. Acute vaginitis  -     Wet Prep, Genital  -     metroNIDAZOLE (METROGEL) 0.75 % (37.5mg/5 gram) vaginal gel; Insert 1 applicator full intravaginally before bedtime for 5 days  Dispense: 1 applicator; Refill: 1    Collected vaginal swab for yeast infection testing.  Performed wet prep and vaginal exam to " rule out bacterial vaginosis or yeast infection.  Adrienne reports discomfort in the vaginal area which is improving, though she experienced extreme tenderness during the exam.  She has had this significant discomfort for approximately 3-4 days.  Previous labs were negative for Trichomonas, gonorrhea, urinary tract infection, and chlamydia.    Follow up if symptoms worsen or fail to improve. In addition to their scheduled follow up, the patient has also been instructed to follow up on as needed basis.     This note was generated with the assistance of ambient listening technology. Verbal consent was obtained by the patient and accompanying visitor(s) for the recording of patient appointment to facilitate this note. I attest to having reviewed and edited the generated note for accuracy, though some syntax or spelling errors may persist. Please contact the author of this note for any clarification.    Terrell Prasad, Adult-Gerontology NP